# Patient Record
Sex: MALE | Race: ASIAN | Employment: FULL TIME | URBAN - METROPOLITAN AREA
[De-identification: names, ages, dates, MRNs, and addresses within clinical notes are randomized per-mention and may not be internally consistent; named-entity substitution may affect disease eponyms.]

---

## 2017-05-26 ENCOUNTER — OFFICE VISIT (OUTPATIENT)
Dept: INTERNAL MEDICINE CLINIC | Age: 62
End: 2017-05-26

## 2017-05-26 VITALS
BODY MASS INDEX: 25.77 KG/M2 | TEMPERATURE: 99 F | DIASTOLIC BLOOD PRESSURE: 70 MMHG | WEIGHT: 174 LBS | RESPIRATION RATE: 19 BRPM | HEART RATE: 89 BPM | SYSTOLIC BLOOD PRESSURE: 140 MMHG | HEIGHT: 69 IN | OXYGEN SATURATION: 91 %

## 2017-05-26 DIAGNOSIS — Z12.11 COLON CANCER SCREENING: ICD-10-CM

## 2017-05-26 DIAGNOSIS — I10 ESSENTIAL HYPERTENSION WITH GOAL BLOOD PRESSURE LESS THAN 130/85: ICD-10-CM

## 2017-05-26 DIAGNOSIS — E11.9 CONTROLLED TYPE 2 DIABETES MELLITUS WITHOUT COMPLICATION, WITHOUT LONG-TERM CURRENT USE OF INSULIN (HCC): Primary | ICD-10-CM

## 2017-05-26 LAB
GLUCOSE POC: 257 MG/DL
HBA1C MFR BLD HPLC: 9.4 %

## 2017-05-26 RX ORDER — PIOGLITAZONEHYDROCHLORIDE 45 MG/1
45 TABLET ORAL DAILY
Qty: 30 TAB | Refills: 12 | Status: SHIPPED | OUTPATIENT
Start: 2017-05-26 | End: 2018-02-02 | Stop reason: SDUPTHER

## 2017-05-26 RX ORDER — METFORMIN HYDROCHLORIDE 500 MG/1
TABLET ORAL
Qty: 120 TAB | Refills: 12 | Status: SHIPPED | OUTPATIENT
Start: 2017-05-26 | End: 2018-02-02 | Stop reason: SDUPTHER

## 2017-05-26 RX ORDER — GLIPIZIDE 10 MG/1
10 TABLET ORAL 2 TIMES DAILY
Qty: 60 TAB | Refills: 12 | Status: SHIPPED | OUTPATIENT
Start: 2017-05-26 | End: 2018-02-02 | Stop reason: SDUPTHER

## 2017-05-26 RX ORDER — ATORVASTATIN CALCIUM 40 MG/1
40 TABLET, FILM COATED ORAL
Qty: 30 TAB | Refills: 12 | Status: SHIPPED | OUTPATIENT
Start: 2017-05-26 | End: 2017-11-02 | Stop reason: SDUPTHER

## 2017-05-26 RX ORDER — LISINOPRIL 10 MG/1
TABLET ORAL
Qty: 30 TAB | Refills: 11 | Status: SHIPPED | OUTPATIENT
Start: 2017-05-26 | End: 2018-12-13 | Stop reason: SDUPTHER

## 2017-05-26 NOTE — MR AVS SNAPSHOT
Visit Information Date & Time Provider Department Dept. Phone Encounter #  
 5/26/2017 10:30 AM Carlos Raphael MD [address] [place] [address] [phone] [id number] Follow-up Instructions Return in about 3 months (around 8/26/2017), or if symptoms worsen or fail to improve. Upcoming Health Maintenance Date Due  
 EYE EXAM RETINAL OR DILATED Q1 9/14/1964 Pneumococcal 19-64 Medium Risk (1 of 1 - PPSV23) 9/14/1973 DTaP/Tdap/Td series (1 - Tdap) 9/14/1975 HEMOGLOBIN A1C Q6M 2/8/2017 FOBT Q 1 YEAR AGE 50-75 2/11/2017 INFLUENZA AGE 9 TO ADULT 8/1/2017 LIPID PANEL Q1 8/8/2017 FOOT EXAM Q1 5/26/2018 MICROALBUMIN Q1 5/26/2018 Allergies as of 5/26/2017  Review Complete On: 5/26/2017 By: Carlos Raphael MD  
 No Known Allergies Current Immunizations  Reviewed on 11/24/2014 Name Date Influenza Vaccine Intradermal PF 11/24/2014 Not reviewed this visit You Were Diagnosed With   
  
 Codes Comments Controlled type 2 diabetes mellitus without complication, without long-term current use of insulin ([facility]    -  Primary ICD-10-CM: E11.9 ICD-9-CM: 250.00 Essential hypertension with goal blood pressure less than 130/85     ICD-10-CM: I10 
ICD-9-CM: 401.9 Colon cancer screening     ICD-10-CM: Z12.11 ICD-9-CM: V76.51 Vitals BP Pulse Temp Resp Height(growth percentile) Weight(growth percentile) 140/70 89 99 °F (37.2 °C) (Oral) 19 5' 9\" (1.753 m) 174 lb (78.9 kg) SpO2 BMI Smoking Status 91% 25.7 kg/m2 Current Every Day Smoker BMI and BSA Data Body Mass Index Body Surface Area 25.7 kg/m 2 1.96 m 2 Preferred Pharmacy Pharmacy Name Phone [address], [address] [phone] Your Updated Medication List  
  
   
This list is accurate as of: 5/26/17 11:23 AM.  Always use your most recent med list.  
  
  
  
  
 atorvastatin 40 mg tablet Commonly known as:  LIPITOR Take 1 Tab by mouth nightly. Blood-Glucose Meter monitoring kit Use twice a day  
  
 glipiZIDE 10 mg tablet Commonly known as:  Lawerence Gal Take 1 Tab by mouth two (2) times a day. lisinopril 10 mg tablet Commonly known as:  PRINIVIL, ZESTRIL  
TAKE 1 TABLET BY MOUTH EVERY DAY  
  
 metFORMIN 500 mg tablet Commonly known as:  GLUCOPHAGE  
TAKE 2 TABLETS BY MOUTH TWICE DAILY pioglitazone 45 mg tablet Commonly known as:  ACTOS Take 1 Tab by mouth daily. sildenafil citrate 100 mg tablet Commonly known as:  VIAGRA Take 1 Tab by mouth as needed. Prescriptions Sent to Pharmacy Refills  
 metFORMIN (GLUCOPHAGE) 500 mg tablet 12 Sig: TAKE 2 TABLETS BY MOUTH TWICE DAILY Class: Normal  
 Pharmacy: [address] Ph #: [phone]  
 glipiZIDE (GLUCOTROL) 10 mg tablet 12 Sig: Take 1 Tab by mouth two (2) times a day. Class: Normal  
 Pharmacy: [address] Ph #: [phone] Route: Oral  
 lisinopril (PRINIVIL, ZESTRIL) 10 mg tablet 11 Sig: TAKE 1 TABLET BY MOUTH EVERY DAY Class: Normal  
 Pharmacy: [address] Ph #: [phone]  
 atorvastatin (LIPITOR) 40 mg tablet 12 Sig: Take 1 Tab by mouth nightly. Class: Normal  
 Pharmacy: [address] Ph #: [phone] Route: Oral  
 pioglitazone (ACTOS) 45 mg tablet 12 Sig: Take 1 Tab by mouth daily.   
 Class: Normal  
 Pharmacy: [address] [facility] #: [phone] Route: Oral  
  
We Performed the Following AMB POC GLUCOSE BLOOD, BY GLUCOSE MONITORING DEVICE [27942 CPT(R)] AMB POC HEMOGLOBIN A1C [58771 CPT(R)] AMB POC URINE, MICROALBUMIN, SEMIQUANTITATIVE [51164 CPT(R)] LIPID PANEL [14982 CPT(R)] OCCULT BLOOD, IMMUNOASSAY (FIT) [id number] CPT(R)] Follow-up Instructions Return in about 3 months (around 2017), or if symptoms worsen or fail to improve. Patient Instructions CipherGraph Networkshart Activation Thank you for requesting access to BeeTV. Please follow the instructions below to securely access and download your online medical record. BeeTV allows you to send messages to your doctor, view your test results, renew your prescriptions, schedule appointments, and more. How Do I Sign Up? 1. In your internet browser, go to [email or web address] 
2. Click on the First Time User? Click Here link in the Sign In box. You will be redirect to the New Member Sign Up page. 3. Enter your BeeTV Access Code exactly as it appears below. You will not need to use this code after youve completed the sign-up process. If you do not sign up before the expiration date, you must request a new code. BeeTV Access Code: Activation code not generated Current BeeTV Status: Patient Declined (This is the date your BeeTV access code will ) 4. Enter the last four digits of your Social Security Number (xxxx) and Date of Birth (mm/dd/yyyy) as indicated and click Submit. You will be taken to the next sign-up page. 5. Create a BeeTV ID. This will be your BeeTV login ID and cannot be changed, so think of one that is secure and easy to remember. 6. Create a BeeTV password. You can change your password at any time. 7. Enter your Password Reset Question and Answer. This can be used at a later time if you forget your password. 8. Enter your e-mail address. You will receive e-mail notification when new information is available in [address] 9. Click Sign Up. You can now view and download portions of your medical record. 10. Click the Download Summary menu link to download a portable copy of your medical information. Additional Information If you have questions, please visit the Frequently Asked Questions section of the [email or web address] website at [email or web address] Remember, [email or web address] is NOT to be used for urgent needs. For medical emergencies, dial 911. Please provide this summary of care documentation to your next provider. Your primary care clinician is listed as Cristóbal Vanegas. If you have any questions after today's visit, please call [phone].

## 2017-05-26 NOTE — PATIENT INSTRUCTIONS
APSharAdFinance Activation    Thank you for requesting access to AddressReport. Please follow the instructions below to securely access and download your online medical record. AddressReport allows you to send messages to your doctor, view your test results, renew your prescriptions, schedule appointments, and more. How Do I Sign Up? 1. In your internet browser, go to [email or web address].[email or web address] [email or web address]  2. Click on the First Time User? Click Here link in the Sign In box. You will be redirect to the New Member Sign Up page. 3. Enter your AddressReport Access Code exactly as it appears below. You will not need to use this code after youve completed the sign-up process. If you do not sign up before the expiration date, you must request a new code. AddressReport Access Code: Activation code not generated  Current AddressReport Status: Patient Declined (This is the date your AddressReport access code will )    4. Enter the last four digits of your Social Security Number (xxxx) and Date of Birth (mm/dd/yyyy) as indicated and click Submit. You will be taken to the next sign-up page. 5. Create a AddressReport ID. This will be your AddressReport login ID and cannot be changed, so think of one that is secure and easy to remember. 6. Create a AddressReport password. You can change your password at any time. 7. Enter your Password Reset Question and Answer. This can be used at a later time if you forget your password. 8. Enter your e-mail address. You will receive e-mail notification when new information is available in [address] 9. Click Sign Up. You can now view and download portions of your medical record. 10. Click the Download Summary menu link to download a portable copy of your medical information. Additional Information    If you have questions, please visit the Frequently Asked Questions section of the AddressReport website at [email or web address] Remember, AddressReport is NOT to be used for urgent needs. For medical emergencies, dial 911.

## 2017-05-26 NOTE — PROGRESS NOTES
Reba Palacios is a 57 y.o. male and presents with Diabetes and Hypertension  . Subjective:  Hypertension Review:  The patient has hypertension . Diet and Lifestyle: generally follows a low sodium diet, exercises sporadically  Home BP Monitoring: is not measured at home. Pertinent ROS: taking medications as instructed, no medication side effects noted, no TIA's, no chest pain on exertion, no dyspnea on exertion, no swelling of ankles. Dyslipidemia Review:  Patient presents for evaluation of lipids. Compliance with treatment thus far has been excellent. A repeat fasting lipid profile was done. The patient does not use medications that may worsen dyslipidemias . The patient exercises sporadically. The patient is not known to have coexisting coronary artery disease    Diabetes Mellitus Review:  He has diabetes mellitus. Diabetic ROS - medication noncompliance: , diabetic diet compliance: compliant all of the time, home glucose monitoring: is performed. Known diabetic complications: none  Cardiovascular risk factors: family history, dyslipidemia, diabetes mellitus, obesity, hypertension  Current diabetic medications include oral agents   Eye exam current (within one year): no  Weight trend: stable  Prior visit with dietician: no  Current diet: \"healthy\" diet  in general  Current exercise: walking  Current monitoring regimen: home blood tests - daily  Home blood sugar records: trend: stable  Any episodes of hypoglycemia? no  Is He on ACE inhibitor or angiotensin II receptor blocker? Yes   Lab review: orders written for new lab studies as appropriate; see orders. Erectile dysfunction Review[de-identified]  Patient complains of difficulty in maintaining an adequate erection. He states that his present medication is helping thus far.     Health maintenance suggests the needs for a test for occult blood    Health maintenance suggest urine protein check          Review of Systems  Constitutional: negative for fevers, chills, anorexia and weight loss  Eyes:   negative for visual disturbance and irritation  ENT:   negative for tinnitus,sore throat,nasal congestion,ear pains. hoarseness  Respiratory:  negative for cough, hemoptysis, dyspnea,wheezing  CV:   negative for chest pain, palpitations, lower extremity edema  GI:   negative for nausea, vomiting, diarrhea, abdominal pain,melena  Endo:               negative for polyuria,polydipsia,polyphagia,heat intolerance  Genitourinary: negative for frequency, dysuria and hematuria  Integument:  negative for rash and pruritus  Hematologic:  negative for easy bruising and gum/nose bleeding  Musculoskel: negative for myalgias, arthralgias, back pain, muscle weakness, joint pain  Neurological:  negative for headaches, dizziness, vertigo, memory problems and gait   Behavl/Psych: negative for feelings of anxiety, depression, mood changes    Past Medical History:   Diagnosis Date    [facility] 4/13/2011    Edema 4/13/2011    Hypertension 4/13/2011     History reviewed. No pertinent surgical history. Social History     Social History    Marital status:      Spouse name: N/A    Number of children: N/A    Years of education: N/A     Social History Main Topics    Smoking status: Current Every Day Smoker    Smokeless tobacco: Never Used    Alcohol use Yes    Drug use: No    Sexual activity: Not Asked     Other Topics Concern    None     Social History Narrative     Family History   Problem Relation Age of Onset    Diabetes Mother      Current Outpatient Prescriptions   Medication Sig Dispense Refill    metFORMIN (GLUCOPHAGE) 500 mg tablet TAKE 2 TABLETS BY MOUTH TWICE DAILY 120 Tab 12    glipiZIDE (GLUCOTROL) 10 mg tablet Take 1 Tab by mouth two (2) times a day. 60 Tab 12    lisinopril (PRINIVIL, ZESTRIL) 10 mg tablet TAKE 1 TABLET BY MOUTH EVERY DAY 30 Tab 11    atorvastatin (LIPITOR) 40 mg tablet Take 1 Tab by mouth nightly.  30 Tab 12    pioglitazone (ACTOS) 45 mg tablet Take 1 Tab by mouth daily. 30 Tab 12    Blood-Glucose Meter monitoring kit Use twice a day 1 Kit 0    sildenafil citrate (VIAGRA) 100 mg tablet Take 1 Tab by mouth as needed. 4 Tab 12     No Known Allergies    Objective:  Visit Vitals    /70    Pulse 89    Temp 99 °F (37.2 °C) (Oral)    Resp 19    Ht 5' 9\" (1.753 m)    Wt 174 lb (78.9 kg)    SpO2 91%    BMI 25.7 kg/m2     Physical Exam:   General appearance - alert, well appearing, and in no distress  Mental status - alert, oriented to person, place, and time  EYE-HEIDY, EOMI, corneas normal, no foreign bodies  ENT-ENT exam normal, no neck nodes or sinus tenderness  Nose - normal and patent, no erythema, discharge or polyps  Mouth - mucous membranes moist, pharynx normal without lesions  Neck - supple, no significant adenopathy   Chest - clear to auscultation, no wheezes, rales or rhonchi, symmetric air entry   Heart - normal rate, regular rhythm, normal S1, S2, no murmurs, rubs, clicks or gallops   Abdomen - soft, nontender, nondistended, no masses or organomegaly  Lymph- no adenopathy palpable  Ext-peripheral pulses normal, no pedal edema, no clubbing or cyanosis  Skin-Warm and dry.  no hyperpigmentation, vitiligo, or suspicious lesions  Neuro -alert, oriented, normal speech, no focal findings or movement disorder noted  Neck-normal C-spine, no tenderness, full ROM without pain  Feet-no nail deformities or callus formation with good pulses noted      Results for orders placed or performed in visit on 08/08/16   PROSTATE SPECIFIC AG (PSA)   Result Value Ref Range    Prostate Specific Ag 1.4 0.0 - 4.0 ng/mL   AMB POC GLUCOSE BLOOD, BY GLUCOSE MONITORING DEVICE   Result Value Ref Range    Glucose  mg/dL   AMB POC LIPID PROFILE   Result Value Ref Range    Cholesterol (POC) 173     Triglycerides (POC) 69     HDL Cholesterol (POC) 32     LDL Cholesterol (POC) 127     Non-HDL Goal (POC) 141     TChol/HDL Ratio (POC) 5.4    AMB POC HEMOGLOBIN A1C   Result Value Ref Range    Hemoglobin A1c (POC) 7.5 %       Assessment/Plan:    ICD-10-CM ICD-9-CM    1. Controlled type 2 diabetes mellitus without complication, without long-term current use of insulin (HCC) E11.9 250.00 AMB POC GLUCOSE BLOOD, BY GLUCOSE MONITORING DEVICE      AMB POC HEMOGLOBIN A1C      LIPID PANEL      metFORMIN (GLUCOPHAGE) 500 mg tablet      glipiZIDE (GLUCOTROL) 10 mg tablet      lisinopril (PRINIVIL, ZESTRIL) 10 mg tablet      atorvastatin (LIPITOR) 40 mg tablet      pioglitazone (ACTOS) 45 mg tablet   2. Essential hypertension with goal blood pressure less than 130/85 I10 401.9 AMB POC GLUCOSE BLOOD, BY GLUCOSE MONITORING DEVICE      AMB POC HEMOGLOBIN A1C      LIPID PANEL      glipiZIDE (GLUCOTROL) 10 mg tablet      lisinopril (PRINIVIL, ZESTRIL) 10 mg tablet      atorvastatin (LIPITOR) 40 mg tablet      pioglitazone (ACTOS) 45 mg tablet     Orders Placed This Encounter    LIPID PANEL    AMB POC GLUCOSE BLOOD, BY GLUCOSE MONITORING DEVICE    AMB POC HEMOGLOBIN A1C    metFORMIN (GLUCOPHAGE) 500 mg tablet     Sig: TAKE 2 TABLETS BY MOUTH TWICE DAILY     Dispense:  120 Tab     Refill:  12    glipiZIDE (GLUCOTROL) 10 mg tablet     Sig: Take 1 Tab by mouth two (2) times a day. Dispense:  60 Tab     Refill:  12    lisinopril (PRINIVIL, ZESTRIL) 10 mg tablet     Sig: TAKE 1 TABLET BY MOUTH EVERY DAY     Dispense:  30 Tab     Refill:  11    atorvastatin (LIPITOR) 40 mg tablet     Sig: Take 1 Tab by mouth nightly. Dispense:  30 Tab     Refill:  12    pioglitazone (ACTOS) 45 mg tablet     Sig: Take 1 Tab by mouth daily. Dispense:  30 Tab     Refill:  12     lose weight, increase physical activity, follow low fat diet, follow low salt diet,Take 81mg aspirin daily  Patient Instructions   Civicon Activation    Thank you for requesting access to Civicon. Please follow the instructions below to securely access and download your online medical record.  Civicon allows you to send messages to your doctor, view your test results, renew your prescriptions, schedule appointments, and more. How Do I Sign Up? 1. In your internet browser, go to [email or web address].[email or web address]  2. Click on the First Time User? Click Here link in the Sign In box. You will be redirect to the New Member Sign Up page. 3. Enter your "[employer] Access Code exactly as it appears below. You will not need to use this code after youve completed the sign-up process. If you do not sign up before the expiration date, you must request a new code. MyChart Access Code: Activation code not generated  Current "[employer] Status: Patient Declined (This is the date your Ecloud (Nanj[employer] Information and Technologyt access code will )    4. Enter the last four digits of your Social Security Number (xxxx) and Date of Birth (mm/dd/yyyy) as indicated and click Submit. You will be taken to the next sign-up page. 5. Create a "[employer] ID. This will be your "[employer] login ID and cannot be changed, so think of one that is secure and easy to remember. 6. Create a "[employer] password. You can change your password at any time. 7. Enter your Password Reset Question and Answer. This can be used at a later time if you forget your password. 8. Enter your e-mail address. You will receive e-mail notification when new information is available in [address] 9. Click Sign Up. You can now view and download portions of your medical record. 10. Click the Download Summary menu link to download a portable copy of your medical information. Additional Information    If you have questions, please visit the Frequently Asked Questions section of the "[employer] website at [email or web address] Remember, "[employer] is NOT to be used for urgent needs. For medical emergencies, dial 911. Follow-up Disposition:  Return in about 3 months (around 2017), or if symptoms worsen or fail to improve.       I have reviewed with the patient details of the assessment and plan and all questions were answered. Relevent patient education was performed. The most recent lab findings were reviewed with the patient. An After Visit Summary was printed and given to the patient.

## 2017-05-27 LAB
CHOLEST SERPL-MCNC: 183 MG/DL (ref 100–199)
HDLC SERPL-MCNC: 33 MG/DL
INTERPRETATION, 910389: NORMAL
LDLC SERPL CALC-MCNC: 132 MG/DL (ref 0–99)
TRIGL SERPL-MCNC: 92 MG/DL (ref 0–149)
VLDLC SERPL CALC-MCNC: 18 MG/DL (ref 5–40)

## 2017-10-27 ENCOUNTER — OFFICE VISIT (OUTPATIENT)
Dept: INTERNAL MEDICINE CLINIC | Age: 62
End: 2017-10-27

## 2017-10-27 VITALS
BODY MASS INDEX: 26.81 KG/M2 | TEMPERATURE: 97.6 F | SYSTOLIC BLOOD PRESSURE: 136 MMHG | OXYGEN SATURATION: 94 % | HEART RATE: 51 BPM | WEIGHT: 181 LBS | RESPIRATION RATE: 16 BRPM | DIASTOLIC BLOOD PRESSURE: 75 MMHG | HEIGHT: 69 IN

## 2017-10-27 DIAGNOSIS — J44.9 CHRONIC OBSTRUCTIVE PULMONARY DISEASE, UNSPECIFIED COPD TYPE (HCC): ICD-10-CM

## 2017-10-27 DIAGNOSIS — N40.0 BENIGN PROSTATIC HYPERPLASIA WITHOUT LOWER URINARY TRACT SYMPTOMS: ICD-10-CM

## 2017-10-27 DIAGNOSIS — R80.9 PROTEINURIA, UNSPECIFIED TYPE: ICD-10-CM

## 2017-10-27 DIAGNOSIS — E78.5 DYSLIPIDEMIA: ICD-10-CM

## 2017-10-27 DIAGNOSIS — I10 ESSENTIAL HYPERTENSION: ICD-10-CM

## 2017-10-27 DIAGNOSIS — R19.5 OCCULT BLOOD IN STOOLS: ICD-10-CM

## 2017-10-27 DIAGNOSIS — Z00.00 PHYSICAL EXAM: ICD-10-CM

## 2017-10-27 NOTE — MR AVS SNAPSHOT
Visit Information Date & Time Provider Department Dept. Phone Encounter #  
 10/27/2017 10:00 AM Tip Aguilar MD [facility] [phone] [id number] Follow-up Instructions Return in about 4 weeks (around 11/24/2017). Upcoming Health Maintenance Date Due FOBT Q 1 YEAR AGE 50-75 2/11/2017 HEMOGLOBIN A1C Q6M 11/26/2017 EYE EXAM RETINAL OR DILATED Q1 2/27/2018* FOOT EXAM Q1 5/26/2018 MICROALBUMIN Q1 5/26/2018 LIPID PANEL Q1 5/26/2018 DTaP/Tdap/Td series (2 - Td) 10/27/2027 *Topic was postponed. The date shown is not the original due date. Allergies as of 10/27/2017  Review Complete On: 10/27/2017 By: Crystal Benson No Known Allergies Current Immunizations  Reviewed on 11/24/2014 Name Date Influenza Vaccine Intradermal PF 11/24/2014 Not reviewed this visit You Were Diagnosed With   
  
 Codes Comments Uncontrolled type 2 diabetes mellitus with complication, without long-term current use of insulin ([facility]    -  Primary ICD-10-CM: E11.8, E11.65 ICD-9-CM: 250.82 Essential hypertension     ICD-10-CM: I10 
ICD-9-CM: 401.9 Dyslipidemia     ICD-10-CM: E78.5 ICD-9-CM: 272.4 Chronic obstructive pulmonary disease, unspecified COPD type ([facility]     ICD-10-CM: J44.9 ICD-9-CM: 061 Proteinuria, unspecified type     ICD-10-CM: R80.9 ICD-9-CM: 791.0 Benign prostatic hyperplasia without lower urinary tract symptoms     ICD-10-CM: N40.0 ICD-9-CM: 600.00 Occult blood in stools     ICD-10-CM: R19.5 ICD-9-CM: 792.1 Vitals BP Pulse Temp Resp Height(growth percentile) Weight(growth percentile) 136/75 (BP 1 Location: Right arm, BP Patient Position: Sitting) (!) 51 97.6 °F (36.4 °C) (Oral) 16 5' 9\" (1.753 m) 181 lb (82.1 kg) SpO2 BMI Smoking Status 94% 26.73 kg/m2 Current Every Day Smoker Vitals History BMI and BSA Data Body Mass Index Body Surface Area  
 26.73 kg/m 2 2 m 2 Preferred Pharmacy Pharmacy Name Phone [address], [address] [phone] Your Updated Medication List  
  
   
This list is accurate as of: 10/27/17  2:41 PM.  Always use your most recent med list.  
  
  
  
  
 atorvastatin 40 mg tablet Commonly known as:  LIPITOR Take 1 Tab by mouth nightly. Blood-Glucose Meter monitoring kit Use twice a day  
  
 glipiZIDE 10 mg tablet Commonly known as:  Occitan Fernanda Take 1 Tab by mouth two (2) times a day. lisinopril 10 mg tablet Commonly known as:  PRINIVIL, ZESTRIL  
TAKE 1 TABLET BY MOUTH EVERY DAY  
  
 metFORMIN 500 mg tablet Commonly known as:  GLUCOPHAGE  
TAKE 2 TABLETS BY MOUTH TWICE DAILY pioglitazone 45 mg tablet Commonly known as:  ACTOS Take 1 Tab by mouth daily. sildenafil citrate 100 mg tablet Commonly known as:  VIAGRA Take 1 Tab by mouth as needed. We Performed the Following AMB POC EKG ROUTINE W/ 12 LEADS, INTER & REP [99960 CPT(R)] APOLIPOPROTEIN B [id number] CPT(R)] CBC WITH AUTOMATED DIFF [45195 CPT(R)] CREATININE, UR, RANDOM [id number] CPT(R)] HEMOGLOBIN A1C WITH EAG [76463 CPT(R)] LIPID PANEL [73311 CPT(R)] METABOLIC PANEL, COMPREHENSIVE [66868 CPT(R)] OCCULT BLOOD, IMMUNOASSAY (FIT) [id number] CPT(R)] AL COLLECTION VENOUS BLOOD,VENIPUNCTURE [id number] CPT(R)] Melvin Comes [[id number] Custom] PSA, DIAGNOSTIC (PROSTATE SPECIFIC AG) [id number] CPT(R)] URINALYSIS W/ RFLX MICROSCOPIC [84328 CPT(R)] Follow-up Instructions Return in about 4 weeks (around 11/24/2017). Introducing [facility]! Dear Jerry Veliz: Thank you for requesting a [employer] account.   Our records indicate that you have previously registered for a [employer]rote[employer] account but its currently inactive. Please call our Spine Wave support line at [phone]. Additional Information If you have questions, please visit the Frequently Asked Questions section of the Spine Wave website at [email or web address] Remember, Spine Wave is NOT to be used for urgent needs. For medical emergencies, dial 911. Now available from your iPhone and Android! Please provide this summary of care documentation to your next provider. If you have any questions after today's visit, please call [phone].

## 2017-10-27 NOTE — PROGRESS NOTES
Chief Complaint   Patient presents with    New Patient     Hx of diabetes      1. Have you been to the ER, urgent care clinic since your last visit? Hospitalized since your last visit? No    2. Have you seen or consulted any other health care providers outside of the [address] [place] [address] since your last visit? Include any pap smears or colon screening.  No

## 2017-10-27 NOTE — PROGRESS NOTES
[address] [facility]  [place] [address]  [address]  [address] [address]  Phone:  [phone]  Fax: [phone]    Chief Complaint   Patient presents with    New Patient     Hx of diabetes        SUBJECTIVE:    Chinyere Addison is a 60 y.o. male He comes in as a new patient. He has had diabetes for at least 8+ years. He has been taking two agents. He states that his average sugar is in the 120 range. His last hemoglobin A1C; however, was a bit elevated. He has been taking antihypertensive medication for the eight year time, which is Lisinopril only. He is also maintained on a statin for his increased cardiovascular risks. He has a long history of cigarette smoking and he continues to smoke a half pack per day. He currently has no pulmonary symptoms. When he was first diagnosed, he was in the 220 range and he is now down to the 180 range    He works on a regular basis and is in no way impaired. Current Outpatient Prescriptions   Medication Sig Dispense Refill    metFORMIN (GLUCOPHAGE) 500 mg tablet TAKE 2 TABLETS BY MOUTH TWICE DAILY 120 Tab 12    glipiZIDE (GLUCOTROL) 10 mg tablet Take 1 Tab by mouth two (2) times a day. 60 Tab 12    lisinopril (PRINIVIL, ZESTRIL) 10 mg tablet TAKE 1 TABLET BY MOUTH EVERY DAY 30 Tab 11    atorvastatin (LIPITOR) 40 mg tablet Take 1 Tab by mouth nightly. 30 Tab 12    pioglitazone (ACTOS) 45 mg tablet Take 1 Tab by mouth daily. 30 Tab 12    sildenafil citrate (VIAGRA) 100 mg tablet Take 1 Tab by mouth as needed. 4 Tab 12    Blood-Glucose Meter monitoring kit Use twice a day 1 Kit 0     Past Medical History:   Diagnosis Date    Callous ulcer ([facility] 4/13/2011    Edema 4/13/2011    Hypertension 4/13/2011     History reviewed. No pertinent surgical history.   No Known Allergies    REVIEW OF SYSTEMS:  General: negative for - chills or fever  ENT: negative for - headaches, nasal congestion or tinnitus  Respiratory: negative for - cough, hemoptysis, shortness of breath or wheezing  Cardiovascular : negative for - chest pain, edema, palpitations or shortness of breath  Gastrointestinal: negative for - abdominal pain, blood in stools, heartburn or nausea/vomiting  Genito-Urinary: no dysuria, trouble voiding, or hematuria  Musculoskeletal: negative for - gait disturbance, joint pain, joint stiffness or joint swelling  Neurological: no TIA or stroke symptoms  Hematologic: no bruises, no bleeding, no swollen glands  Integument: no lumps, mole changes, nail changes or rash  Endocrine:no malaise/lethargy or unexpected weight changes      Social History     Social History    Marital status:      Spouse name: N/A    Number of children: N/A    Years of education: N/A     Social History Main Topics    Smoking status: Current Every Day Smoker     Packs/day: 0.25     Years: 45.00    Smokeless tobacco: Never Used    Alcohol use Yes    Drug use: No    Sexual activity: Yes     Partners: Female     Other Topics Concern    None     Social History Narrative     Family History   Problem Relation Age of Onset    Diabetes Mother        OBJECTIVE:     Visit Vitals    /75 (BP 1 Location: Right arm, BP Patient Position: Sitting)    Pulse (!) 51    Temp 97.6 °F (36.4 °C) (Oral)    Resp 16    Ht 5' 9\" (1.753 m)    Wt 181 lb (82.1 kg)    SpO2 94%    BMI 26.73 kg/m2     CONSTITUTIONAL: well , well nourished, appears age appropriate  EYES: perrla, eom intact  ENMT:moist mucous membranes, pharynx clear  NECK: supple. Thyroid normal  RESPIRATORY: Chest:, decreased breath sounds bilaterally, rare basilar rales  CARDIOVASCULAR: Heart: regular rate and rhythm  GASTROINTESTINAL: Abdomen: soft, bowel sounds active  HEMATOLOGIC: no pathological lymph nodes palpated  MUSCULOSKELETAL: Extremities: no edema, pulse 1+   INTEGUMENT: No unusual rashes or suspicious skin lesions noted.  Nails appear normal.  NEUROLOGIC: non-focal exam   MENTAL STATUS: alert and oriented, appropriate affect  Genitalia: Normal male and no hernias  Rectal: Prostate 2+ firm and nontender, no rectal masses, brown stool heme test negative    ASSESSMENT:   1. Uncontrolled type 2 diabetes mellitus with complication, without long-term current use of insulin ([address]    2. Essential hypertension    3. Dyslipidemia    4. Chronic obstructive pulmonary disease, unspecified COPD type ([address]    5. Proteinuria, unspecified type    6. Benign prostatic hyperplasia without lower urinary tract symptoms    7. Occult blood in stools    8. Physical exam        PLAN:  1. The patient's diabetes is probably not well controlled. I will await the results of his hemoglobin A1C.    2. There is modest proteinuria present. This would suggest the possibility of diabetic nephropathy, but I need to quantitate the degree of proteinuria. If this is the case then patient has had diabetes for much longer than eight years. 3. Blood pressure is excellent and no adjustments are made at this point. His blood pressure is 136/70.    4. He is at significantly increased cardiovascular risk. He remains on a statin, which is quite appropriate. 5. He has significant COPD and I encouraged him to discontinue cigarette smoking. .  Orders Placed This Encounter    OCCULT BLOOD, IMMUNOASSAY (FIT)    APOLIPOPROTEIN B    CBC WITH AUTOMATED DIFF    LIPID PANEL    URINALYSIS W/ RFLX MICROSCOPIC    PROSTATE SPECIFIC AG    METABOLIC PANEL, COMPREHENSIVE    HEMOGLOBIN A1C WITH EAG    PROTEIN,TOTAL,URINE    CREATININE, UR, RANDOM    MICROSCOPIC EXAMINATION    AMB POC EKG ROUTINE W/ 12 LEADS, INTER & REP         ATTENTION:   This medical record was transcribed using an electronic medical records system. Although proofread, it may and can contain electronic and spelling errors. Other human spelling and other errors may be present. Corrections may be executed at a later time.   Please feel free to contact us for any clarifications as needed. Follow-up Disposition:  Return in about 4 weeks (around 11/24/2017).       Yuridia Finley MD

## 2017-10-28 LAB
ALBUMIN SERPL-MCNC: 4 G/DL (ref 3.6–4.8)
ALBUMIN/GLOB SERPL: 1.3 {RATIO} (ref 1.2–2.2)
ALP SERPL-CCNC: 110 IU/L (ref 39–117)
ALT SERPL-CCNC: 17 IU/L (ref 0–44)
APO B SERPL-MCNC: 99 MG/DL (ref 52–135)
APPEARANCE UR: CLEAR
AST SERPL-CCNC: 15 IU/L (ref 0–40)
BACTERIA #/AREA URNS HPF: NORMAL /[HPF]
BASOPHILS # BLD AUTO: 0.1 X10E3/UL (ref 0–0.2)
BASOPHILS NFR BLD AUTO: 1 %
BILIRUB SERPL-MCNC: 0.6 MG/DL (ref 0–1.2)
BILIRUB UR QL STRIP: NEGATIVE
BUN SERPL-MCNC: 12 MG/DL (ref 8–27)
BUN/CREAT SERPL: 11 (ref 10–24)
CALCIUM SERPL-MCNC: 9.9 MG/DL (ref 8.6–10.2)
CASTS URNS QL MICRO: NORMAL /LPF
CHLORIDE SERPL-SCNC: 100 MMOL/L (ref 96–106)
CHOLEST SERPL-MCNC: 172 MG/DL (ref 100–199)
CO2 SERPL-SCNC: 27 MMOL/L (ref 18–29)
COLOR UR: YELLOW
CREAT SERPL-MCNC: 1.05 MG/DL (ref 0.76–1.27)
CREAT UR-MCNC: 92.6 MG/DL
EOSINOPHIL # BLD AUTO: 0.2 X10E3/UL (ref 0–0.4)
EOSINOPHIL NFR BLD AUTO: 2 %
EPI CELLS #/AREA URNS HPF: NORMAL /HPF
ERYTHROCYTE [DISTWIDTH] IN BLOOD BY AUTOMATED COUNT: 15 % (ref 12.3–15.4)
EST. AVERAGE GLUCOSE BLD GHB EST-MCNC: 189 MG/DL
GFR SERPLBLD CREATININE-BSD FMLA CKD-EPI: 75 ML/MIN/1.73
GFR SERPLBLD CREATININE-BSD FMLA CKD-EPI: 87 ML/MIN/1.73
GLOBULIN SER CALC-MCNC: 3.2 G/DL (ref 1.5–4.5)
GLUCOSE SERPL-MCNC: 163 MG/DL (ref 65–99)
GLUCOSE UR QL: NEGATIVE
HBA1C MFR BLD: 8.2 % (ref 4.8–5.6)
HCT VFR BLD AUTO: 48.2 % (ref 37.5–51)
HDLC SERPL-MCNC: 38 MG/DL
HGB BLD-MCNC: 16.9 G/DL (ref 12.6–17.7)
HGB UR QL STRIP: NEGATIVE
IMM GRANULOCYTES # BLD: 0 X10E3/UL (ref 0–0.1)
IMM GRANULOCYTES NFR BLD: 0 %
KETONES UR QL STRIP: NEGATIVE
LDLC SERPL CALC-MCNC: 118 MG/DL (ref 0–99)
LEUKOCYTE ESTERASE UR QL STRIP: ABNORMAL
LYMPHOCYTES # BLD AUTO: 2.6 X10E3/UL (ref 0.7–3.1)
LYMPHOCYTES NFR BLD AUTO: 35 %
MCH RBC QN AUTO: 31 PG (ref 26.6–33)
MCHC RBC AUTO-ENTMCNC: 35.1 G/DL (ref 31.5–35.7)
MCV RBC AUTO: 88 FL (ref 79–97)
MICRO URNS: ABNORMAL
MONOCYTES # BLD AUTO: 0.4 X10E3/UL (ref 0.1–0.9)
MONOCYTES NFR BLD AUTO: 6 %
MUCOUS THREADS URNS QL MICRO: PRESENT
NEUTROPHILS # BLD AUTO: 4.1 X10E3/UL (ref 1.4–7)
NEUTROPHILS NFR BLD AUTO: 56 %
NITRITE UR QL STRIP: NEGATIVE
PH UR STRIP: 6 [PH] (ref 5–7.5)
PLATELET # BLD AUTO: 286 X10E3/UL (ref 150–379)
POTASSIUM SERPL-SCNC: 5.2 MMOL/L (ref 3.5–5.2)
PROT SERPL-MCNC: 7.2 G/DL (ref 6–8.5)
PROT UR QL STRIP: ABNORMAL
PROT UR-MCNC: 37.7 MG/DL
PSA SERPL-MCNC: 1.4 NG/ML (ref 0–4)
RBC # BLD AUTO: 5.46 X10E6/UL (ref 4.14–5.8)
RBC #/AREA URNS HPF: NORMAL /HPF
SODIUM SERPL-SCNC: 141 MMOL/L (ref 134–144)
SP GR UR: 1.02 (ref 1–1.03)
TRIGL SERPL-MCNC: 82 MG/DL (ref 0–149)
UROBILINOGEN UR STRIP-MCNC: 0.2 MG/DL (ref 0.2–1)
VLDLC SERPL CALC-MCNC: 16 MG/DL (ref 5–40)
WBC # BLD AUTO: 7.3 X10E3/UL (ref 3.4–10.8)
WBC #/AREA URNS HPF: NORMAL /HPF

## 2017-11-01 NOTE — PROGRESS NOTES
Tell patient to increase his 80 mg specifically 2 tablets daily to see if can tolerate this  Additionally telling his diabetes well controlled but it is not bad-----he needs to remain on all his medication and I will await his next value before making adjustments

## 2017-11-02 DIAGNOSIS — I10 ESSENTIAL HYPERTENSION WITH GOAL BLOOD PRESSURE LESS THAN 130/85: ICD-10-CM

## 2017-11-02 DIAGNOSIS — E11.9 CONTROLLED TYPE 2 DIABETES MELLITUS WITHOUT COMPLICATION, WITHOUT LONG-TERM CURRENT USE OF INSULIN (HCC): ICD-10-CM

## 2017-11-02 NOTE — TELEPHONE ENCOUNTER
PATIENT NOTIFIED PER Dr. Zandra Toscano and ask to increase his atorvastatin 40mg to 2 tabs to equal 80mg. patient told we will call in new dose.

## 2017-11-03 RX ORDER — ATORVASTATIN CALCIUM 80 MG/1
80 TABLET, FILM COATED ORAL
Qty: 30 TAB | Refills: 11 | Status: SHIPPED | OUTPATIENT
Start: 2017-11-03 | End: 2018-12-20 | Stop reason: SDUPTHER

## 2018-02-02 ENCOUNTER — OFFICE VISIT (OUTPATIENT)
Dept: INTERNAL MEDICINE CLINIC | Age: 63
End: 2018-02-02

## 2018-02-02 VITALS
SYSTOLIC BLOOD PRESSURE: 112 MMHG | TEMPERATURE: 97.8 F | BODY MASS INDEX: 26.93 KG/M2 | HEART RATE: 63 BPM | DIASTOLIC BLOOD PRESSURE: 66 MMHG | WEIGHT: 181.8 LBS | OXYGEN SATURATION: 93 % | RESPIRATION RATE: 16 BRPM | HEIGHT: 69 IN

## 2018-02-02 DIAGNOSIS — J44.9 CHRONIC OBSTRUCTIVE PULMONARY DISEASE, UNSPECIFIED COPD TYPE (HCC): ICD-10-CM

## 2018-02-02 DIAGNOSIS — E78.5 DYSLIPIDEMIA: ICD-10-CM

## 2018-02-02 DIAGNOSIS — E11.9 CONTROLLED TYPE 2 DIABETES MELLITUS WITHOUT COMPLICATION, WITHOUT LONG-TERM CURRENT USE OF INSULIN (HCC): ICD-10-CM

## 2018-02-02 DIAGNOSIS — B07.0 PLANTAR WART: Primary | ICD-10-CM

## 2018-02-02 DIAGNOSIS — I10 ESSENTIAL HYPERTENSION: ICD-10-CM

## 2018-02-02 RX ORDER — INSULIN PUMP SYRINGE, 3 ML
EACH MISCELLANEOUS
Qty: 1 KIT | Refills: 0 | Status: SHIPPED | OUTPATIENT
Start: 2018-02-02

## 2018-02-02 RX ORDER — GLIPIZIDE 10 MG/1
10 TABLET ORAL 2 TIMES DAILY
Qty: 60 TAB | Refills: 12 | Status: SHIPPED | OUTPATIENT
Start: 2018-02-02 | End: 2019-01-08 | Stop reason: SDUPTHER

## 2018-02-02 RX ORDER — PIOGLITAZONEHYDROCHLORIDE 45 MG/1
45 TABLET ORAL DAILY
Qty: 30 TAB | Refills: 12 | Status: SHIPPED | OUTPATIENT
Start: 2018-02-02 | End: 2019-03-12 | Stop reason: SDUPTHER

## 2018-02-02 RX ORDER — METFORMIN HYDROCHLORIDE 500 MG/1
TABLET ORAL
Qty: 120 TAB | Refills: 12 | Status: SHIPPED | OUTPATIENT
Start: 2018-02-02 | End: 2019-01-14 | Stop reason: CLARIF

## 2018-02-02 RX ORDER — LANCETS
EACH MISCELLANEOUS
Qty: 100 EACH | Refills: 11 | Status: SHIPPED | OUTPATIENT
Start: 2018-02-02

## 2018-02-02 NOTE — PROGRESS NOTES
[address] [facility]  [place] [address]  [address] [place] [address]  Phone:  [phone]  Fax: [phone]       Chief Complaint   Patient presents with    Hip Pain    Cold   . SUBJECTIVE:    Sherryle Bolder is a 60 y.o. male comes in for return visit accompanied by his lady friend. He has a lesion on his right foot which he has had for years. They think it is a callus. He has an upper respiratory infection present for the last two weeks. He has a residual cough currently. The cough is nonproductive. He does indeed smoke cigarettes. He needs refills on his antidiabetic medications having run out one week ago. He also needs glucometer chem strips. He is compliant with a statin in view of his increased cardiovascular risk. Current Outpatient Prescriptions   Medication Sig Dispense Refill    naproxen (NAPROSYN) 500 mg tablet Take 1 Tab by mouth two (2) times daily (with meals). 60 Tab 1    metFORMIN (GLUCOPHAGE) 500 mg tablet TAKE 2 TABLETS BY MOUTH TWICE DAILY 120 Tab 12    glipiZIDE (GLUCOTROL) 10 mg tablet Take 1 Tab by mouth two (2) times a day. 60 Tab 12    pioglitazone (ACTOS) 45 mg tablet Take 1 Tab by mouth daily. 30 Tab 12    atorvastatin (LIPITOR) 80 mg tablet Take 1 Tab by mouth nightly. 30 Tab 11    lisinopril (PRINIVIL, ZESTRIL) 10 mg tablet TAKE 1 TABLET BY MOUTH EVERY DAY 30 Tab 11    sildenafil citrate (VIAGRA) 100 mg tablet Take 1 Tab by mouth as needed. 4 Tab 12    Blood-Glucose Meter monitoring kit Use twice a day 1 Kit 0    insulin glargine-lixisenatide (SOLIQUA 100/33) 100 unit-33 mcg/mL inpn INJECT 15 UNITS DAILY 5 Pen 11    Insulin Needles, Disposable, 31 gauge x 5/16\" ndle Use to inject insulin once daily.  100 Pen Needle 11    Blood-Glucose Meter (ONETOUCH ULTRA2) monitoring kit Use to test blood sugar once a day 1 Kit 0    glucose blood VI test strips (ONETOUCH ULTRA TEST) strip Use to test blood sugar once daily 50 Strip 11    Lancets misc Use to test blood sugar once daily 100 Each 11     Past Medical History:   Diagnosis Date    Callous ulcer ([facility] [address] 4/13/2011    Edema 4/13/2011    Hypertension 4/13/2011     History reviewed. No pertinent surgical history. No Known Allergies      REVIEW OF SYSTEMS:  General: negative for - chills or fever  ENT: negative for - headaches, nasal congestion or tinnitus  Respiratory: negative for - cough, hemoptysis, shortness of breath or wheezing  Cardiovascular : negative for - chest pain, edema, palpitations or shortness of breath  Gastrointestinal: negative for - abdominal pain, blood in stools, heartburn or nausea/vomiting  Genito-Urinary: no dysuria, trouble voiding, or hematuria  Musculoskeletal: negative for - gait disturbance, joint pain, joint stiffness or joint swelling  Neurological: no TIA or stroke symptoms  Hematologic: no bruises, no bleeding, no swollen glands  Integument: no lumps, mole changes, nail changes or rash  Endocrine: no malaise/lethargy or unexpected weight changes      Social History     Social History    Marital status:      Spouse name: N/A    Number of children: N/A    Years of education: N/A     Social History Main Topics    Smoking status: Current Every Day Smoker     Packs/day: 0.25     Years: 45.00    Smokeless tobacco: Never Used    Alcohol use Yes    Drug use: No    Sexual activity: Yes     Partners: Female     Other Topics Concern    None     Social History Narrative     Family History   Problem Relation Age of Onset    Diabetes Mother        OBJECTIVE:    Visit Vitals    /66 (BP 1 Location: Right arm, BP Patient Position: Sitting)    Pulse 63    Temp 97.8 °F (36.6 °C) (Oral)    Resp 16    Ht 5' 9\" (1.753 m)    Wt 181 lb 12.8 oz (82.5 kg)    SpO2 93%    BMI 26.85 kg/m2     CONSTITUTIONAL: well , well nourished, appears age appropriate  EYES: perrla, eom intact  ENMT:moist mucous membranes, pharynx clear  NECK: supple. Thyroid normal  RESPIRATORY: Chest: clear to ascultation and percussion   CARDIOVASCULAR: Heart: regular rate and rhythm  GASTROINTESTINAL: Abdomen: soft, bowel sounds active  HEMATOLOGIC: no pathological lymph nodes palpated  MUSCULOSKELETAL: Extremities: no edema, pulse 1+   INTEGUMENT: No unusual rashes or suspicious skin lesions noted. Nails appear normal, one large wart right foot with several calluses lateral aspect left foot  NEUROLOGIC: non-focal exam   MENTAL STATUS: alert and oriented, appropriate affect      ASSESSMENT:  1. Plantar wart    2. Chronic obstructive pulmonary disease, unspecified COPD type ([facility]    3. Controlled type 2 diabetes mellitus without complication, without long-term current use of insulin ([facility]    4. Essential hypertension    5. Dyslipidemia      Diagnoses and all orders for this visit:    1. Plantar wart  -     REFERRAL TO PODIATRY    2. Chronic obstructive pulmonary disease, unspecified COPD type ([facility]  Assessment & Plan:  Stable, based on history, physical exam and review of pertinent labs, studies and medications; meds reconciled; lifestyle modifications recommended. Key COPD Medications     Patient is on no COPD/Asthma meds. Lab Results   Component Value Date/Time    WBC 7.3 10/27/2017 12:27 PM    HGB 16.9 10/27/2017 12:27 PM    HCT 48.2 10/27/2017 12:27 PM    PLATELET 272 62/63/3626 12:27 PM         3. Controlled type 2 diabetes mellitus without complication, without long-term current use of insulin (HCC)  Assessment & Plan:  Stable, based on history, physical exam and review of pertinent labs, studies and medications; meds reconciled; lifestyle modifications recommended, medication compliance emphasized. Key Antihyperglycemic Medications             metFORMIN (GLUCOPHAGE) 500 mg tablet  (Taking) TAKE 2 TABLETS BY MOUTH TWICE DAILY    glipiZIDE (GLUCOTROL) 10 mg tablet  (Taking) Take 1 Tab by mouth two (2) times a day.     pioglitazone (ACTOS) 45 mg tablet  (Taking) Take 1 Tab by mouth daily. Other Key Diabetic Medications             atorvastatin (LIPITOR) 80 mg tablet  (Taking) Take 1 Tab by mouth nightly. lisinopril (PRINIVIL, ZESTRIL) 10 mg tablet  (Taking) TAKE 1 TABLET BY MOUTH EVERY DAY        Lab Results   Component Value Date/Time    Hemoglobin A1c 10.3 02/02/2018 11:31 AM    Hemoglobin A1c (POC) 9.4 05/26/2017 11:53 AM    Glucose 163 10/27/2017 12:27 PM    Creatinine 1.05 10/27/2017 12:27 PM    Cholesterol, total 172 10/27/2017 12:27 PM    HDL Cholesterol 38 10/27/2017 12:27 PM    LDL, calculated 118 10/27/2017 12:27 PM    Triglyceride 82 10/27/2017 12:27 PM     Diabetic Foot and Eye Exam HM Status   Topic Date Due    Eye Exam  09/14/1965    Diabetic Foot Care  05/26/2018       Orders:  -     HEMOGLOBIN A1C WITH EAG  -     metFORMIN (GLUCOPHAGE) 500 mg tablet; TAKE 2 TABLETS BY MOUTH TWICE DAILY  -     glipiZIDE (GLUCOTROL) 10 mg tablet; Take 1 Tab by mouth two (2) times a day. -     pioglitazone (ACTOS) 45 mg tablet; Take 1 Tab by mouth daily. 4. Essential hypertension    5. Dyslipidemia  -     APOLIPOPROTEIN B    Other orders  -     naproxen (NAPROSYN) 500 mg tablet; Take 1 Tab by mouth two (2) times daily (with meals). PLAN:    1. He appears to have a plantar wart on the dorsal aspect of his right foot. He will see a podiatrist regarding this. 2. He does have COPD but his upper respiratory infection is actually improving nicely. Nothing more need be done other than cessation of cigarette smoking. 3. I am not entirely sure how his diabetes is doing. His last hemoglobin A1c was 8.5. He is maxed out on three different agents. If this trend continues, he will have to be on insulin. 4. Blood pressure is excellent today, no adjustments are made. 5. He is at an increased cardiovascular risk in view of his age and existing comorbidities. Efficacy and compliance will be assessed.     6. Nothing need be done for the upper respiratory infection. .  Orders Placed This Encounter    HEMOGLOBIN A1C WITH EAG    APOLIPOPROTEIN B    REFERRAL TO PODIATRY    metFORMIN (GLUCOPHAGE) 500 mg tablet    glipiZIDE (GLUCOTROL) 10 mg tablet    pioglitazone (ACTOS) 45 mg tablet    naproxen (NAPROSYN) 500 mg tablet         Follow-up Disposition:  Return in about 3 months (around 5/2/2018).       Diane Garza MD

## 2018-02-02 NOTE — MR AVS SNAPSHOT
[address] [facility] 
 
 
 [address] [address] 
[phone] Patient: Augustin Foster MRN: [id number] Visit Information Date & Time Provider Department Dept. Phone Encounter #  
 2/2/2018  9:45 AM Melvin Hobbs MD [facility] [phone] [id number] Your Appointments 6/4/2018  9:45 AM  
Any with Melvin Hobbs MD  
[address] [place] [address] and Primary Care [facility] Appt Note: follow up  
 [address] [facility]  
  
   
 [id number] Upcoming Health Maintenance Date Due FOBT Q 1 YEAR AGE 50-75 2/11/2017 EYE EXAM RETINAL OR DILATED Q1 2/27/2018* HEMOGLOBIN A1C Q6M 4/27/2018 FOOT EXAM Q1 5/26/2018 MICROALBUMIN Q1 5/26/2018 LIPID PANEL Q1 10/27/2018 DTaP/Tdap/Td series (2 - Td) 10/27/2027 *Topic was postponed. The date shown is not the original due date. Allergies as of 2/2/2018  Review Complete On: 2/2/2018 By: Jaycee Robles No Known Allergies Current Immunizations  Reviewed on 11/24/2014 Name Date Influenza Vaccine Intradermal PF 11/24/2014 Not reviewed this visit You Were Diagnosed With   
  
 Codes Comments Plantar wart    -  Primary ICD-10-CM: B07.0 ICD-9-CM: 078.12 Chronic obstructive pulmonary disease, unspecified COPD type ([facility]     ICD-10-CM: J44.9 ICD-9-CM: 394 Controlled type 2 diabetes mellitus without complication, without long-term current use of insulin ([facility] [facility]     ICD-10-CM: E11.9 ICD-9-CM: 250.00 Essential hypertension     ICD-10-CM: I10 
ICD-9-CM: 401.9 Dyslipidemia     ICD-10-CM: E78.5 ICD-9-CM: 272.4 Essential hypertension with goal blood pressure less than 130/85     ICD-10-CM: I10 
ICD-9-CM: 401.9 Vitals BP Pulse Temp Resp Height(growth percentile) Weight(growth percentile) 112/66 (BP 1 Location: Right arm, BP Patient Position: Sitting) 63 97.8 °F (36.6 °C) (Oral) 16 5' 9\" (1.753 m) 181 lb 12.8 oz (82.5 kg) SpO2 BMI Smoking Status 93% 26.85 kg/m2 Current Every Day Smoker Vitals History BMI and BSA Data Body Mass Index Body Surface Area  
 26.85 kg/m 2 2 m 2 Preferred Pharmacy Pharmacy Name Phone [facility], [address] [phone] Your Updated Medication List  
  
   
This list is accurate as of: 2/2/18 11:45 AM.  Always use your most recent med list.  
  
  
  
  
 atorvastatin 80 mg tablet Commonly known as:  LIPITOR Take 1 Tab by mouth nightly. Blood-Glucose Meter monitoring kit Use twice a day  
  
 glipiZIDE 10 mg tablet Commonly known as:  Evie Correia Take 1 Tab by mouth two (2) times a day. lisinopril 10 mg tablet Commonly known as:  PRINIVIL, ZESTRIL  
TAKE 1 TABLET BY MOUTH EVERY DAY  
  
 metFORMIN 500 mg tablet Commonly known as:  GLUCOPHAGE  
TAKE 2 TABLETS BY MOUTH TWICE DAILY pioglitazone 45 mg tablet Commonly known as:  ACTOS Take 1 Tab by mouth daily. sildenafil citrate 100 mg tablet Commonly known as:  VIAGRA Take 1 Tab by mouth as needed. We Performed the Following APOLIPOPROTEIN B [id number] CPT(R)] HEMOGLOBIN A1C WITH EAG [95548 CPT(R)] Introducing [facility] SERVICES! Dear Russ Land: Thank you for requesting a [employer] account. Our records indicate that you have previously registered for a [employer] account but its currently inactive. Please call our [employer] support line at [phone]. Additional Information If you have questions, please visit the Frequently Asked Questions section of the [employer] website at [email or web address] Remember, [employer] is NOT to be used for urgent needs. For medical emergencies, dial 911. Now available from your iPhone and Android! Please provide this summary of care documentation to your next provider. If you have any questions after today's visit, please call [phone].

## 2018-02-02 NOTE — LETTER
NOTIFICATION RETURN TO WORK / SCHOOL 
 
2/2/2018 11:48 AM 
 
Mr. Lore Suarez [address] [facility] [phone] To Whom It May Concern: 
 
Lore Suarez is currently under the care of Edil Pablo. He will return to work on: Tuesday 02/06/18. If there are questions or concerns please have the patient contact our office. Sincerely, Slava Kaur MD

## 2018-02-02 NOTE — PROGRESS NOTES
Chief Complaint   Patient presents with    Hip Pain    Cold     1. Have you been to the ER, urgent care clinic since your last visit? Hospitalized since your last visit? No    2. Have you seen or consulted any other health care providers outside of the [address] [place] [address] since your last visit? Include any pap smears or colon screening.  No

## 2018-02-03 LAB
APO B SERPL-MCNC: 119 MG/DL (ref 52–135)
EST. AVERAGE GLUCOSE BLD GHB EST-MCNC: 249 MG/DL
HBA1C MFR BLD: 10.3 % (ref 4.8–5.6)

## 2018-02-03 NOTE — PROGRESS NOTES
Start Sherryle Kocher at 15 units daily. He will DC the glipizide and continue metformin and Actos. Needs to be sent for instruction in injection. Reminder that he must eat at the same time every day. Will need a coupon.   See me with any questions

## 2018-02-08 RX ORDER — PEN NEEDLE, DIABETIC 30 GX3/16"
NEEDLE, DISPOSABLE MISCELLANEOUS
Qty: 100 PEN NEEDLE | Refills: 11 | Status: SHIPPED | OUTPATIENT
Start: 2018-02-08 | End: 2019-01-14 | Stop reason: SDUPTHER

## 2018-02-08 NOTE — TELEPHONE ENCOUNTER
PATIENT NOTIFIED BY PHONE AND ASK TO COME BY OFFICE FOR A COUPON FOR [address] [place] [address]. PATIENT TOLD OF ELEVATED A1C. PER DR. CHAVEZ PATIENT TO START TAKING 15 UNITS DAILY. HE IS TO D/C THE GLIPIZIDE, AND CONTINUE THE METFORMIN AND ACTOS. PATIENT TO COME BY OFFICE WHEN FILLED AND WILL BE SHOWN HOW TO USE THE PEN. PATIENT ASK TO MAKE SURE HE EAT AT SAME TIME EVERY DAY.

## 2018-02-16 ENCOUNTER — TELEPHONE (OUTPATIENT)
Dept: INTERNAL MEDICINE CLINIC | Age: 63
End: 2018-02-16

## 2018-02-16 NOTE — TELEPHONE ENCOUNTER
Patient insurance will not cover the [place] or [facility]. Per Dr. Daniel Price patient ask to start Novolog 70/30 8 units every morning. Patient told the importance of eating on time and at the same time everyday. Patient ask to check blood sugars ac breakfast and dinner and call me every 3 days.

## 2018-02-28 PROBLEM — J44.9 CHRONIC OBSTRUCTIVE PULMONARY DISEASE (HCC): Status: ACTIVE | Noted: 2018-02-28

## 2018-02-28 PROBLEM — E78.5 DYSLIPIDEMIA: Status: ACTIVE | Noted: 2018-02-28

## 2018-02-28 RX ORDER — NAPROXEN 500 MG/1
500 TABLET ORAL 2 TIMES DAILY WITH MEALS
Qty: 60 TAB | Refills: 1 | Status: SHIPPED | OUTPATIENT
Start: 2018-02-28

## 2018-03-01 NOTE — ASSESSMENT & PLAN NOTE
Stable, based on history, physical exam and review of pertinent labs, studies and medications; meds reconciled; lifestyle modifications recommended, medication compliance emphasized. Key Antihyperglycemic Medications             metFORMIN (GLUCOPHAGE) 500 mg tablet  (Taking) TAKE 2 TABLETS BY MOUTH TWICE DAILY    glipiZIDE (GLUCOTROL) 10 mg tablet  (Taking) Take 1 Tab by mouth two (2) times a day. pioglitazone (ACTOS) 45 mg tablet  (Taking) Take 1 Tab by mouth daily. Other Key Diabetic Medications             atorvastatin (LIPITOR) 80 mg tablet  (Taking) Take 1 Tab by mouth nightly.     lisinopril (PRINIVIL, ZESTRIL) 10 mg tablet  (Taking) TAKE 1 TABLET BY MOUTH EVERY DAY        Lab Results   Component Value Date/Time    Hemoglobin A1c 10.3 02/02/2018 11:31 AM    Hemoglobin A1c (POC) 9.4 05/26/2017 11:53 AM    Glucose 163 10/27/2017 12:27 PM    Creatinine 1.05 10/27/2017 12:27 PM    Cholesterol, total 172 10/27/2017 12:27 PM    HDL Cholesterol 38 10/27/2017 12:27 PM    LDL, calculated 118 10/27/2017 12:27 PM    Triglyceride 82 10/27/2017 12:27 PM     Diabetic Foot and Eye Exam HM Status   Topic Date Due    Eye Exam  09/14/1965    Diabetic Foot Care  05/26/2018

## 2018-03-01 NOTE — ASSESSMENT & PLAN NOTE
Stable, based on history, physical exam and review of pertinent labs, studies and medications; meds reconciled; lifestyle modifications recommended. Key COPD Medications     Patient is on no COPD/Asthma meds.         Lab Results   Component Value Date/Time    WBC 7.3 10/27/2017 12:27 PM    HGB 16.9 10/27/2017 12:27 PM    HCT 48.2 10/27/2017 12:27 PM    PLATELET 713 36/02/8545 12:27 PM

## 2018-03-15 ENCOUNTER — TELEPHONE (OUTPATIENT)
Dept: INTERNAL MEDICINE CLINIC | Age: 63
End: 2018-03-15

## 2018-03-20 ENCOUNTER — OFFICE VISIT (OUTPATIENT)
Dept: INTERNAL MEDICINE CLINIC | Age: 63
End: 2018-03-20

## 2018-03-20 VITALS
TEMPERATURE: 98.2 F | HEIGHT: 69 IN | HEART RATE: 59 BPM | OXYGEN SATURATION: 95 % | DIASTOLIC BLOOD PRESSURE: 70 MMHG | RESPIRATION RATE: 18 BRPM | SYSTOLIC BLOOD PRESSURE: 149 MMHG | BODY MASS INDEX: 27.11 KG/M2 | WEIGHT: 183 LBS

## 2018-03-20 DIAGNOSIS — E11.9 CONTROLLED TYPE 2 DIABETES MELLITUS WITHOUT COMPLICATION, WITHOUT LONG-TERM CURRENT USE OF INSULIN (HCC): Primary | ICD-10-CM

## 2018-03-20 DIAGNOSIS — I10 ESSENTIAL HYPERTENSION: ICD-10-CM

## 2018-03-20 DIAGNOSIS — J44.9 CHRONIC OBSTRUCTIVE PULMONARY DISEASE, UNSPECIFIED COPD TYPE (HCC): ICD-10-CM

## 2018-03-20 DIAGNOSIS — E78.5 DYSLIPIDEMIA: ICD-10-CM

## 2018-03-20 NOTE — PROGRESS NOTES
Chief Complaint   Patient presents with    Medication Evaluation     Patient would like to discuss insulin prescription. 1. Have you been to the ER, urgent care clinic since your last visit? Hospitalized since your last visit? No    2. Have you seen or consulted any other health care providers outside of the [address] [place] [address] since your last visit? Include any pap smears or colon screening.  No

## 2018-03-20 NOTE — MR AVS SNAPSHOT
[address] [facility] 
 
 
 [address] [id number] 
[phone] Patient: Bruno Martinez MRN: [id number] LID:[id number] Visit Information Date & Time Provider Department Dept. Phone Encounter #  
 3/20/2018  4:00 PM Margaret Jimenez MD [facility] [phone] Your Appointments 5/21/2018  9:15 AM  
Any with Margaret Jimenez MD  
[address] [place] [address] and Primary Care [facility] Appt Note: follow up  
 Edil Hi [address] [facility]  
  
   
 [address] [id number] Upcoming Health Maintenance Date Due  
 EYE EXAM RETINAL OR DILATED Q1 4/20/2018* FOBT Q 1 YEAR AGE 50-75 4/20/2018* FOOT EXAM Q1 5/26/2018 MICROALBUMIN Q1 5/26/2018 HEMOGLOBIN A1C Q6M 8/2/2018 LIPID PANEL Q1 10/27/2018 DTaP/Tdap/Td series (2 - Td) 10/27/2027 *Topic was postponed. The date shown is not the original due date. Allergies as of 3/20/2018  Review Complete On: 2/28/2018 By: Margaret Jimenez MD  
 No Known Allergies Current Immunizations  Reviewed on 11/24/2014 Name Date Influenza Vaccine Intradermal PF 11/24/2014 Not reviewed this visit Vitals BP Pulse Temp Resp Height(growth percentile) Weight(growth percentile) 149/70 (BP 1 Location: Right arm, BP Patient Position: Sitting) (!) 59 98.2 °F (36.8 °C) (Oral) 18 5' 9\" (1.753 m) 183 lb (83 kg) SpO2 BMI Smoking Status 95% 27.02 kg/m2 Current Every Day Smoker Vitals History BMI and BSA Data Body Mass Index Body Surface Area  
 27.02 kg/m 2 2.01 m 2 Preferred Pharmacy Pharmacy Name Phone [phone] Your Updated Medication List  
  
   
This list is accurate as of 3/20/18  5:36 PM.  Always use your most recent med list.  
  
  
  
  
 atorvastatin 80 mg tablet Commonly known as:  LIPITOR Take 1 Tab by mouth nightly. * Blood-Glucose Meter monitoring kit Use twice a day * Blood-Glucose Meter monitoring kit Commonly known as:  Rema Byersch Use to test blood sugar once a day  
  
 glipiZIDE 10 mg tablet Commonly known as:  Vamsi Gain Take 1 Tab by mouth two (2) times a day. glucose blood VI test strips strip Commonly known as:  ONETOUCH ULTRA TEST Use to test blood sugar once daily  
  
 insulin glargine-lixisenatide 100 unit-33 mcg/mL Inpn Commonly known as:  SOLIQUA 100/33 INJECT 15 UNITS DAILY Insulin Needles (Disposable) 31 gauge x 5/16\" Ndle Use to inject insulin once daily. Lancets Misc Use to test blood sugar once daily  
  
 lisinopril 10 mg tablet Commonly known as:  PRINIVIL, ZESTRIL  
TAKE 1 TABLET BY MOUTH EVERY DAY  
  
 metFORMIN 500 mg tablet Commonly known as:  GLUCOPHAGE  
TAKE 2 TABLETS BY MOUTH TWICE DAILY  
  
 naproxen 500 mg tablet Commonly known as:  NAPROSYN Take 1 Tab by mouth two (2) times daily (with meals). pioglitazone 45 mg tablet Commonly known as:  ACTOS Take 1 Tab by mouth daily. sildenafil citrate 100 mg tablet Commonly known as:  VIAGRA Take 1 Tab by mouth as needed. * Notice: This list has 2 medication(s) that are the same as other medications prescribed for you. Read the directions carefully, and ask your doctor or other care provider to review them with you. Introducing [facility]! Dear Supriya Khan: Thank you for requesting a uma information technology account. Our records indicate that you have previously registered for a uma information technology account but its currently inactive. Please call our uma information technology support line at [phone]. Additional Information If you have questions, please visit the Frequently Asked Questions section of the uma information technology website at [email or web address] Remember, MyChart is NOT to be used for urgent needs. For medical emergencies, dial 911. Now available from your iPhone and Android! Please provide this summary of care documentation to your next provider. Your primary care clinician is listed as Saul Izaguirre. If you have any questions after today's visit, please call [phone].

## 2018-03-21 NOTE — PROGRESS NOTES
[address] [facility] and Primary [facility]  [place] [address]  [address] [place] [address]  Phone:  [phone]  Fax: [phone]       Chief Complaint   Patient presents with    Medication Evaluation     Patient would like to discuss insulin prescription. .      SUBJECTIVE:    Marianne Pereyra is a 60 y.o. male comes in for return visit. The patient never got the combination insulin and GLP-1 agonist.  The cost was an issue. In reality, the actual charge is about $90. In any event as far as his oral agents are concerned, he is only taking half of the dosing. He and his wife admit to dietary indiscretion with the consumption of a lot of fatty items as well as processed carbohydrates. He continues to smoke cigarettes. He has existing COPD. He has a past history of primary hypertension and dyslipidemia. Current Outpatient Prescriptions   Medication Sig Dispense Refill    naproxen (NAPROSYN) 500 mg tablet Take 1 Tab by mouth two (2) times daily (with meals). 60 Tab 1    Insulin Needles, Disposable, 31 gauge x 5/16\" ndle Use to inject insulin once daily. 100 Pen Needle 11    metFORMIN (GLUCOPHAGE) 500 mg tablet TAKE 2 TABLETS BY MOUTH TWICE DAILY 120 Tab 12    glipiZIDE (GLUCOTROL) 10 mg tablet Take 1 Tab by mouth two (2) times a day. 60 Tab 12    pioglitazone (ACTOS) 45 mg tablet Take 1 Tab by mouth daily. 30 Tab 12    Blood-Glucose Meter (ONETOUCH ULTRA2) monitoring kit Use to test blood sugar once a day 1 Kit 0    glucose blood VI test strips (ONETOUCH ULTRA TEST) strip Use to test blood sugar once daily 50 Strip 11    Lancets misc Use to test blood sugar once daily 100 Each 11    atorvastatin (LIPITOR) 80 mg tablet Take 1 Tab by mouth nightly. 30 Tab 11    lisinopril (PRINIVIL, ZESTRIL) 10 mg tablet TAKE 1 TABLET BY MOUTH EVERY DAY 30 Tab 11    sildenafil citrate (VIAGRA) 100 mg tablet Take 1 Tab by mouth as needed.  4 Tab 12    Blood-Glucose Meter monitoring kit Use twice a day 1 Kit 0    insulin glargine-lixisenatide (SOLIQUA 100/33) 100 unit-33 mcg/mL inpn INJECT 15 UNITS DAILY 5 Pen 11     Past Medical History:   Diagnosis Date    Callous ulcer ([address] 4/13/2011    Edema 4/13/2011    Hypertension 4/13/2011     History reviewed. No pertinent surgical history.   No Known Allergies      REVIEW OF SYSTEMS:  General: negative for - chills or fever  ENT: negative for - headaches, nasal congestion or tinnitus  Respiratory: negative for - cough, hemoptysis, shortness of breath or wheezing  Cardiovascular : negative for - chest pain, edema, palpitations or shortness of breath  Gastrointestinal: negative for - abdominal pain, blood in stools, heartburn or nausea/vomiting  Genito-Urinary: no dysuria, trouble voiding, or hematuria  Musculoskeletal: negative for - gait disturbance, joint pain, joint stiffness or joint swelling  Neurological: no TIA or stroke symptoms  Hematologic: no bruises, no bleeding, no swollen glands  Integument: no lumps, mole changes, nail changes or rash  Endocrine: no malaise/lethargy or unexpected weight changes      Social History     Social History    Marital status:      Spouse name: N/A    Number of children: N/A    Years of education: N/A     Social History Main Topics    Smoking status: Current Every Day Smoker     Packs/day: 0.25     Years: 45.00    Smokeless tobacco: Never Used    Alcohol use Yes    Drug use: No    Sexual activity: Yes     Partners: Female     Other Topics Concern    None     Social History Narrative     Family History   Problem Relation Age of Onset    Diabetes Mother        OBJECTIVE:    Visit Vitals    /70 (BP 1 Location: Right arm, BP Patient Position: Sitting)    Pulse (!) 59    Temp 98.2 °F (36.8 °C) (Oral)    Resp 18    Ht 5' 9\" (1.753 m)    Wt 183 lb (83 kg)    SpO2 95%    BMI 27.02 kg/m2     CONSTITUTIONAL: well , well nourished, appears age appropriate  EYES: perrla, eom intact  ENMT:moist mucous membranes, pharynx clear  NECK: supple. Thyroid normal  RESPIRATORY: Chest: clear to ascultation and percussion   CARDIOVASCULAR: Heart: regular rate and rhythm  GASTROINTESTINAL: Abdomen: soft, bowel sounds active  HEMATOLOGIC: no pathological lymph nodes palpated  MUSCULOSKELETAL: Extremities: no edema, pulse 1+   INTEGUMENT: No unusual rashes or suspicious skin lesions noted. Nails appear normal.  NEUROLOGIC: non-focal exam   MENTAL STATUS: alert and oriented, appropriate affect      ASSESSMENT:  1. Controlled type 2 diabetes mellitus without complication, without long-term current use of insulin ([address]    2. Dyslipidemia    3. Essential hypertension    4. Chronic obstructive pulmonary disease, unspecified COPD type ([address]        PLAN:    1. The patient's diabetes is poorly-controlled but I will not start the insulin/GLP-1 agonist just yet. He is told to take his medications as prescribed. Specifically, he was only taking half the dose of the metformin as was the case with the Glipizide. He will take the full doses. He will also continue the Actos at 45 mg every day. If he is not at goal on his return visit, then the combination  medication will be given. I remind him and his wife that it is important that he adhere to his fat/carbohydrate restricted diet. 2. He will continue statin as prescribed in view of his increased cardiovascular risk. 3. Blood pressure is excellent today. 4. I again encouraged the patient to discontinue cigarette smoking in view of his COPD and the added cardiovascular risk created by this usage. .  Orders Placed This Encounter    FRUCTOSAMINE         Follow-up Disposition:  Return in about 2 months (around 5/20/2018).       Simon Michael MD

## 2018-03-23 LAB — FRUCTOSAMINE SERPL-SCNC: 262 UMOL/L (ref 0–285)

## 2018-12-20 DIAGNOSIS — E11.9 CONTROLLED TYPE 2 DIABETES MELLITUS WITHOUT COMPLICATION, WITHOUT LONG-TERM CURRENT USE OF INSULIN (HCC): ICD-10-CM

## 2018-12-20 DIAGNOSIS — I10 ESSENTIAL HYPERTENSION WITH GOAL BLOOD PRESSURE LESS THAN 130/85: ICD-10-CM

## 2018-12-20 RX ORDER — ATORVASTATIN CALCIUM 80 MG/1
80 TABLET, FILM COATED ORAL
Qty: 30 TAB | Refills: 11 | Status: SHIPPED | OUTPATIENT
Start: 2018-12-20 | End: 2019-01-08 | Stop reason: SDUPTHER

## 2019-01-06 ENCOUNTER — HOSPITAL ENCOUNTER (EMERGENCY)
Age: 64
Discharge: HOME | End: 2019-01-07
Attending: EMERGENCY MEDICINE
Payer: COMMERCIAL

## 2019-01-06 DIAGNOSIS — R73.9 HYPERGLYCEMIA: Primary | ICD-10-CM

## 2019-01-06 LAB
ALBUMIN SERPL-MCNC: 3.3 G/DL (ref 3.5–5)
ALBUMIN/GLOB SERPL: 0.8 {RATIO} (ref 1.1–2.2)
ALP SERPL-CCNC: 154 U/L (ref 45–117)
ALT SERPL-CCNC: 25 U/L (ref 12–78)
ANION GAP SERPL CALC-SCNC: 6 MMOL/L (ref 5–15)
AST SERPL-CCNC: 23 U/L (ref 15–37)
BASOPHILS # BLD: 0.1 K/UL (ref 0–0.1)
BASOPHILS NFR BLD: 1 % (ref 0–1)
BILIRUB SERPL-MCNC: 0.6 MG/DL (ref 0.2–1)
BUN SERPL-MCNC: 11 MG/DL (ref 6–20)
BUN/CREAT SERPL: 10 (ref 12–20)
CALCIUM SERPL-MCNC: 9.5 MG/DL (ref 8.5–10.1)
CHLORIDE SERPL-SCNC: 102 MMOL/L (ref 97–108)
CO2 SERPL-SCNC: 28 MMOL/L (ref 21–32)
CREAT SERPL-MCNC: 1.09 MG/DL (ref 0.7–1.3)
DIFFERENTIAL METHOD BLD: NORMAL
EOSINOPHIL # BLD: 0.1 K/UL (ref 0–0.4)
EOSINOPHIL NFR BLD: 2 % (ref 0–7)
ERYTHROCYTE [DISTWIDTH] IN BLOOD BY AUTOMATED COUNT: 13.2 % (ref 11.5–14.5)
GLOBULIN SER CALC-MCNC: 4.3 G/DL (ref 2–4)
GLUCOSE BLD STRIP.AUTO-MCNC: 316 MG/DL (ref 65–100)
GLUCOSE SERPL-MCNC: 330 MG/DL (ref 65–100)
HCT VFR BLD AUTO: 48.2 % (ref 36.6–50.3)
HGB BLD-MCNC: 17 G/DL (ref 12.1–17)
IMM GRANULOCYTES # BLD: 0 K/UL (ref 0–0.04)
IMM GRANULOCYTES NFR BLD AUTO: 0 % (ref 0–0.5)
LYMPHOCYTES # BLD: 2.5 K/UL (ref 0.8–3.5)
LYMPHOCYTES NFR BLD: 38 % (ref 12–49)
MCH RBC QN AUTO: 31 PG (ref 26–34)
MCHC RBC AUTO-ENTMCNC: 35.3 G/DL (ref 30–36.5)
MCV RBC AUTO: 87.8 FL (ref 80–99)
MONOCYTES # BLD: 0.4 K/UL (ref 0–1)
MONOCYTES NFR BLD: 6 % (ref 5–13)
NEUTS SEG # BLD: 3.4 K/UL (ref 1.8–8)
NEUTS SEG NFR BLD: 52 % (ref 32–75)
NRBC # BLD: 0 K/UL (ref 0–0.01)
NRBC BLD-RTO: 0 PER 100 WBC
PLATELET # BLD AUTO: 240 K/UL (ref 150–400)
PMV BLD AUTO: 10.6 FL (ref 8.9–12.9)
POTASSIUM SERPL-SCNC: 4.4 MMOL/L (ref 3.5–5.1)
PROT SERPL-MCNC: 7.6 G/DL (ref 6.4–8.2)
RBC # BLD AUTO: 5.49 M/UL (ref 4.1–5.7)
SERVICE CMNT-IMP: ABNORMAL
SODIUM SERPL-SCNC: 136 MMOL/L (ref 136–145)
WBC # BLD AUTO: 6.6 K/UL (ref 4.1–11.1)

## 2019-01-06 PROCEDURE — 82962 GLUCOSE BLOOD TEST: CPT

## 2019-01-06 PROCEDURE — 80053 COMPREHEN METABOLIC PANEL: CPT

## 2019-01-06 PROCEDURE — 96360 HYDRATION IV INFUSION INIT: CPT

## 2019-01-06 PROCEDURE — 74011250636 HC RX REV CODE- 250/636: Performed by: EMERGENCY MEDICINE

## 2019-01-06 PROCEDURE — 99285 EMERGENCY DEPT VISIT HI MDM: CPT

## 2019-01-06 PROCEDURE — 36415 COLL VENOUS BLD VENIPUNCTURE: CPT

## 2019-01-06 PROCEDURE — 85025 COMPLETE CBC W/AUTO DIFF WBC: CPT

## 2019-01-06 RX ADMIN — SODIUM CHLORIDE 1000 ML: 900 INJECTION, SOLUTION INTRAVENOUS at 23:07

## 2019-01-07 VITALS
HEART RATE: 55 BPM | RESPIRATION RATE: 17 BRPM | HEIGHT: 69 IN | WEIGHT: 175.93 LBS | TEMPERATURE: 98.1 F | SYSTOLIC BLOOD PRESSURE: 143 MMHG | OXYGEN SATURATION: 98 % | BODY MASS INDEX: 26.06 KG/M2 | DIASTOLIC BLOOD PRESSURE: 75 MMHG

## 2019-01-07 DIAGNOSIS — I10 ESSENTIAL HYPERTENSION WITH GOAL BLOOD PRESSURE LESS THAN 130/85: ICD-10-CM

## 2019-01-07 DIAGNOSIS — E11.9 CONTROLLED TYPE 2 DIABETES MELLITUS WITHOUT COMPLICATION, WITHOUT LONG-TERM CURRENT USE OF INSULIN (HCC): ICD-10-CM

## 2019-01-07 LAB
APPEARANCE UR: CLEAR
BACTERIA URNS QL MICRO: NEGATIVE /HPF
BILIRUB UR QL: NEGATIVE
COLOR UR: ABNORMAL
EPITH CASTS URNS QL MICRO: ABNORMAL /LPF
GLUCOSE BLD STRIP.AUTO-MCNC: 268 MG/DL (ref 65–100)
GLUCOSE UR STRIP.AUTO-MCNC: >1000 MG/DL
HGB UR QL STRIP: NEGATIVE
HYALINE CASTS URNS QL MICRO: ABNORMAL /LPF (ref 0–5)
KETONES UR QL STRIP.AUTO: NEGATIVE MG/DL
LEUKOCYTE ESTERASE UR QL STRIP.AUTO: NEGATIVE
NITRITE UR QL STRIP.AUTO: NEGATIVE
PH UR STRIP: 6.5 [PH] (ref 5–8)
PROT UR STRIP-MCNC: 100 MG/DL
RBC #/AREA URNS HPF: ABNORMAL /HPF (ref 0–5)
SERVICE CMNT-IMP: ABNORMAL
SP GR UR REFRACTOMETRY: 1.01 (ref 1–1.03)
UA: UC IF INDICATED,UAUC: ABNORMAL
UROBILINOGEN UR QL STRIP.AUTO: 1 EU/DL (ref 0.2–1)
WBC URNS QL MICRO: ABNORMAL /HPF (ref 0–4)

## 2019-01-07 PROCEDURE — 81001 URINALYSIS AUTO W/SCOPE: CPT

## 2019-01-07 PROCEDURE — 82962 GLUCOSE BLOOD TEST: CPT

## 2019-01-07 NOTE — DISCHARGE INSTRUCTIONS
Patient Education        Learning About High Blood Sugar  What is high blood sugar? Your body turns the food you eat into glucose (sugar), which it uses for energy. But if your body isn't able to use the sugar right away, it can build up in your blood and lead to high blood sugar. When the amount of sugar in your blood stays too high for too much of the time, you may have diabetes. Diabetes is a disease that can cause serious health problems. The good news is that lifestyle changes may help you get your blood sugar back to normal and avoid or delay diabetes. What causes high blood sugar? Sugar (glucose) can build up in your blood if you:  · Are overweight. · Have a family history of diabetes. · Take certain medicines, such as steroids. What are the symptoms? Having high blood sugar may not cause any symptoms at all. Or it may make you feel very thirsty or very hungry. You may also urinate more often than usual, have blurry vision, or lose weight without trying. How is high blood sugar treated? You can take steps to lower your blood sugar level if you understand what makes it get higher. Your doctor may want you to learn how to test your blood sugar level at home. Then you can see how illness, stress, or different kinds of food or medicine raise or lower your blood sugar level. Other tests may be needed to see if you have diabetes. How can you prevent high blood sugar? · Watch your weight. If you're overweight, losing just a small amount of weight may help. Reducing fat around your waist is most important. · Limit the amount of calories, sweets, and unhealthy fat you eat. Ask your doctor if a dietitian can help you. A registered dietitian can help you create meal plans that fit your lifestyle. · Get at least 30 minutes of exercise on most days of the week. Exercise helps control your blood sugar. It also helps you maintain a healthy weight. Walking is a good choice.  You also may want to do other activities, such as running, swimming, cycling, or playing tennis or team sports. · If your doctor prescribed medicines, take them exactly as prescribed. Call your doctor if you think you are having a problem with your medicine. You will get more details on the specific medicines your doctor prescribes. Follow-up care is a key part of your treatment and safety. Be sure to make and go to all appointments, and call your doctor if you are having problems. It's also a good idea to know your test results and keep a list of the medicines you take. Where can you learn more? Go to http://horacio-machelle.info/. Enter O108 in the search box to learn more about \"Learning About High Blood Sugar. \"  Current as of: December 7, 2017  Content Version: 11.8  © 8381-6098 Healthwise, Incorporated. Care instructions adapted under license by [employer] (which disclaims liability or warranty for this information). If you have questions about a medical condition or this instruction, always ask your healthcare professional. [address] any warranty or liability for your use of this information.

## 2019-01-07 NOTE — ED PROVIDER NOTES
EMERGENCY DEPARTMENT HISTORY AND PHYSICAL EXAM  
     
 
Date: 1/6/2019 Patient Name: Ashli Bender History of Presenting Illness Chief Complaint Patient presents with  
 High Blood Sugar  
  reports he was not feeling well today and when he checked his sugar it ws 349 History Provided By: Patient HPI: Ashli Bender is a 58 y.o. male, pmhx DM / HTN, who presents ambulatory to the ED c/o gradual onset hyperglycemia PTA this evening. Pt states his BGL was 349 at home this evening. He reports generalized malaise and gate difficulties that he noted prior to checking his blood sugar. Pt notes a hx of similar sxs with his previous hyperglycemic episodes. He reports taking his regular dose of Metformin prior to coming to the ED. Pt denies any recent follow up with his PCP. He denies any recent changes in his medications or dosages. Pt otherwise specifically denies any recent fevers, chills, nausea, vomiting, diarrhea, abd pain, CP, SOB, urinary sxs, changes in BM, or headache. PCP: Haim Cheung MD 
 
Allergies: NKDA PMHx: Significant for DM, HTN, edema Social Hx: +tobacco (0.25 ppd), +EtOH (occasionally), -Illicit Drugs There are no other complaints, changes, or physical findings at this time. Current Outpatient Medications Medication Sig Dispense Refill  atorvastatin (LIPITOR) 80 mg tablet Take 1 Tab by mouth nightly. 30 Tab 11  
 lisinopril (PRINIVIL, ZESTRIL) 10 mg tablet TAKE 1 TABLET BY MOUTH EVERY DAY 30 Tab 11  
 naproxen (NAPROSYN) 500 mg tablet Take 1 Tab by mouth two (2) times daily (with meals). 60 Tab 1  
 insulin glargine-lixisenatide (SOLIQUA 100/33) 100 unit-33 mcg/mL inpn INJECT 15 UNITS DAILY 5 Pen 11  
 Insulin Needles, Disposable, 31 gauge x 5/16\" ndle Use to inject insulin once daily. 100 Pen Needle 11  
 metFORMIN (GLUCOPHAGE) 500 mg tablet TAKE 2 TABLETS BY MOUTH TWICE DAILY 120 Tab 12  glipiZIDE (GLUCOTROL) 10 mg tablet Take 1 Tab by mouth two (2) times a day. 60 Tab 12  
 pioglitazone (ACTOS) 45 mg tablet Take 1 Tab by mouth daily. 30 Tab 12  Blood-Glucose Meter (ONETOUCH ULTRA2) monitoring kit Use to test blood sugar once a day 1 Kit 0  
 glucose blood VI test strips (ONETOUCH ULTRA TEST) strip Use to test blood sugar once daily 50 Strip 11  Lancets misc Use to test blood sugar once daily 100 Each 11  
 sildenafil citrate (VIAGRA) 100 mg tablet Take 1 Tab by mouth as needed. 4 Tab 12  Blood-Glucose Meter monitoring kit Use twice a day 1 Kit 0 Past History Past Medical History: 
Past Medical History:  
Diagnosis Date  Callous ulcer ([address] 4/13/2011  Edema 4/13/2011  Hypertension 4/13/2011 Past Surgical History: No past surgical history on file. Family History: 
Family History Problem Relation Age of Onset  Diabetes Mother Social History: 
Social History Tobacco Use  Smoking status: Current Every Day Smoker Packs/day: 0.25 Years: 45.00 Pack years: 11.25  Smokeless tobacco: Never Used Substance Use Topics  Alcohol use: Yes  Drug use: No  
 
 
Allergies: 
No Known Allergies Review of Systems Review of Systems Constitutional: Negative for chills and fever.  
     +generalized malaise HENT: Negative. Eyes: Negative. Respiratory: Negative for cough, chest tightness and shortness of breath. Cardiovascular: Negative for chest pain and leg swelling. Gastrointestinal: Negative for abdominal pain, diarrhea, nausea and vomiting. Endocrine: Negative. Genitourinary: Negative for difficulty urinating and dysuria. Musculoskeletal: Positive for gait problem. Negative for myalgias. Skin: Negative. Psychiatric/Behavioral: Negative. All other systems reviewed and are negative. Physical Exam  
Physical Exam  
Constitutional: He is oriented to person, place, and time.  He appears well-developed and well-nourished. No distress. HENT:  
Head: Normocephalic and atraumatic. Nose: Nose normal.  
Mouth/Throat: No oropharyngeal exudate. Eyes: Conjunctivae and EOM are normal. Pupils are equal, round, and reactive to light. Neck: Normal range of motion. Neck supple. No JVD present. Cardiovascular: Normal rate, regular rhythm, normal heart sounds and intact distal pulses. Exam reveals no friction rub. No murmur heard. Pulmonary/Chest: Effort normal and breath sounds normal. No stridor. No respiratory distress. He has no wheezes. He has no rales. Abdominal: Soft. Bowel sounds are normal. He exhibits no distension. There is no tenderness. There is no rebound. Musculoskeletal: Normal range of motion. He exhibits no tenderness. Neurological: He is alert and oriented to person, place, and time. No cranial nerve deficit. He exhibits normal muscle tone. Coordination normal.  
Skin: Skin is warm and dry. No rash noted. He is not diaphoretic. Psychiatric: He has a normal mood and affect. His speech is normal and behavior is normal. Judgment and thought content normal. Cognition and memory are normal.  
Nursing note and vitals reviewed. Diagnostic Study Results Labs - Recent Results (from the past 12 hour(s)) GLUCOSE, POC Collection Time: 01/06/19 10:46 PM  
Result Value Ref Range Glucose (POC) 316 (H) 65 - 100 mg/dL Performed by Huber CBC WITH AUTOMATED DIFF Collection Time: 01/06/19 11:06 PM  
Result Value Ref Range WBC 6.6 4.1 - 11.1 K/uL  
 RBC 5.49 4. 10 - 5.70 M/uL  
 HGB 17.0 12.1 - 17.0 g/dL HCT 48.2 36.6 - 50.3 % MCV 87.8 80.0 - 99.0 FL  
 MCH 31.0 26.0 - 34.0 PG  
 MCHC 35.3 30.0 - 36.5 g/dL  
 RDW 13.2 11.5 - 14.5 % PLATELET 418 425 - 728 K/uL MPV 10.6 8.9 - 12.9 FL  
 NRBC 0.0 0  WBC ABSOLUTE NRBC 0.00 0.00 - 0.01 K/uL NEUTROPHILS 52 32 - 75 % LYMPHOCYTES 38 12 - 49 % MONOCYTES 6 5 - 13 % EOSINOPHILS 2 0 - 7 % BASOPHILS 1 0 - 1 % IMMATURE GRANULOCYTES 0 0.0 - 0.5 % ABS. NEUTROPHILS 3.4 1.8 - 8.0 K/UL  
 ABS. LYMPHOCYTES 2.5 0.8 - 3.5 K/UL  
 ABS. MONOCYTES 0.4 0.0 - 1.0 K/UL  
 ABS. EOSINOPHILS 0.1 0.0 - 0.4 K/UL  
 ABS. BASOPHILS 0.1 0.0 - 0.1 K/UL  
 ABS. IMM. GRANS. 0.0 0.00 - 0.04 K/UL  
 DF AUTOMATED METABOLIC PANEL, COMPREHENSIVE Collection Time: 01/06/19 11:06 PM  
Result Value Ref Range Sodium 136 136 - 145 mmol/L Potassium 4.4 3.5 - 5.1 mmol/L Chloride 102 97 - 108 mmol/L  
 CO2 28 21 - 32 mmol/L Anion gap 6 5 - 15 mmol/L Glucose 330 (H) 65 - 100 mg/dL BUN 11 6 - 20 MG/DL Creatinine 1.09 0.70 - 1.30 MG/DL  
 BUN/Creatinine ratio 10 (L) 12 - 20 GFR est AA >60 >60 ml/min/1.73m2 GFR est non-AA >60 >60 ml/min/1.73m2 Calcium 9.5 8.5 - 10.1 MG/DL Bilirubin, total 0.6 0.2 - 1.0 MG/DL  
 ALT (SGPT) 25 12 - 78 U/L  
 AST (SGOT) 23 15 - 37 U/L Alk. phosphatase 154 (H) 45 - 117 U/L Protein, total 7.6 6.4 - 8.2 g/dL Albumin 3.3 (L) 3.5 - 5.0 g/dL Globulin 4.3 (H) 2.0 - 4.0 g/dL A-G Ratio 0.8 (L) 1.1 - 2.2 GLUCOSE, POC Collection Time: 01/07/19 12:37 AM  
Result Value Ref Range Glucose (POC) 268 (H) 65 - 100 mg/dL Performed by Tanya Chen (CYNTHIAT) Radiologic Studies - No orders to display CT Results  (Last 48 hours) None CXR Results  (Last 48 hours) None Medical Decision Making I am the first provider for this patient. I reviewed the vital signs, available nursing notes, past medical history, past surgical history, family history and social history. Vital Signs-Reviewed the patient's vital signs. Patient Vitals for the past 12 hrs: 
 Temp Pulse Resp BP SpO2  
01/06/19 2242 98.1 °F (36.7 °C) 68 16 165/88 97 % Records Reviewed: Nursing Notes and Old Medical Records Provider Notes (Medical Decision Making): DDX: 
Hyperglycemia, uti, dehydration, dka Plan: Labs, ivf Impression: 
hyperglycemis ED Course:  
Initial assessment performed. The patients presenting problems have been discussed, and they are in agreement with the care plan formulated and outlined with them. I have encouraged them to ask questions as they arise throughout their visit. I reviewed our electronic medical record system for any past medical records that were available that may contribute to the patients current condition, the nursing notes and and vital signs from today's visit Nursing notes will be reviewed as they become available in realtime while the pt has been in the ED. Soha Harper MD 
 
I have spent 3-5 minutes discussing the medical risks of prolonged smoking habits and advised the patient of the benefits of the cessation of smoking, providing specific suggestions on how to quit. Soha Harper MD 
 
PROGRESS NOTE: 
12:11 AM 
Pt resting comfortably at this time. Written by Ashly Cervantes ED Scribe, as dictated by Soha Harper MD 
 
12:43 AM 
Progress note: 
Pt noted to be feeling better, repeat , ready for discharge. Discussed lab findings with pt and/or family, specifically noting BGL trending down. Pt will follow up as instructed with his PCP or endocrinologist. All questions have been answered, pt voiced understanding and agreement with plan. If narcotics were prescribed, pt was advised not to drive or operate heavy machinery. If abx were prescribed, pt advised that diarrhea and rash are possible side effects of the medications. Specific return precautions provided in addition to instructions for pt to return to the ED immediately should sx worsen at any time. Soha Harper MD 
 
 
Critical Care Time:  
 
none Diagnosis Clinical Impression: 1. Hyperglycemia PLAN: 
1. Current Discharge Medication List  
  
 
2. Follow-up Information Follow up With Specialties Details Why Contact Info Gemma Peabody, MD Internal Medicine Schedule an appointment as soon as possible for a visit in 2 days to discuss your diabetes medication [id number] [address] 
[phone] Rosanne Ashley MD Endocrinology Schedule an appointment as soon as possible for a visit in 2 days  [address] [facility] [address] [facility]. 
[phone] Return to ED if worse Disposition: 
 
DISCHARGE NOTE: 
12:43 AM 
The patient's results have been reviewed with family and/or caregiver. They verbally convey their understanding and agreement of the patient's signs, symptoms, diagnosis, treatment, and prognosis. They additionally agree to follow up as recommended in the discharge instructions or to return to the Emergency Room should the patient's condition change prior to their follow-up appointment. The family and/or caregiver verbally agrees with the care-plan and all of their questions have been answered. The discharge instructions have also been provided to the them along with educational information regarding the patient's diagnosis and a list of reasons why the patient would want to return to the ER prior to their follow-up appointment should their condition change. Written by CYNTHIA Ledesmaibpaolo, as dictated by Eneida Sen MD . Attestations: This note is prepared by Patrizia Freeman, acting as MD Eneida Milligan MD : The scribe's documentation has been prepared under my direction and personally reviewed by me in its entirety. I confirm that the note above accurately reflects all work, treatment, procedures, and medical decision making performed by me. This note will not be viewable in [address]

## 2019-01-07 NOTE — PROGRESS NOTES
Dr. Bj Freed reviewed discharge instructions with the patient. The patient verbalized understanding. All questions and concerns were addressed. The patient declined a wheelchair and is discharged ambulatory in the care of family members with instructions and prescriptions in hand. Pt is alert and oriented x 4. Respirations are clear and unlabored. 4

## 2019-01-08 ENCOUNTER — OFFICE VISIT (OUTPATIENT)
Dept: INTERNAL MEDICINE CLINIC | Age: 64
End: 2019-01-08

## 2019-01-08 VITALS
OXYGEN SATURATION: 92 % | TEMPERATURE: 98.3 F | HEART RATE: 60 BPM | DIASTOLIC BLOOD PRESSURE: 76 MMHG | RESPIRATION RATE: 14 BRPM | SYSTOLIC BLOOD PRESSURE: 130 MMHG | WEIGHT: 177.7 LBS | BODY MASS INDEX: 26.32 KG/M2 | HEIGHT: 69 IN

## 2019-01-08 DIAGNOSIS — E11.9 CONTROLLED TYPE 2 DIABETES MELLITUS WITHOUT COMPLICATION, WITHOUT LONG-TERM CURRENT USE OF INSULIN (HCC): ICD-10-CM

## 2019-01-08 DIAGNOSIS — I10 ESSENTIAL HYPERTENSION: ICD-10-CM

## 2019-01-08 DIAGNOSIS — E11.65 UNCONTROLLED TYPE 2 DIABETES MELLITUS WITH HYPERGLYCEMIA (HCC): Primary | ICD-10-CM

## 2019-01-08 DIAGNOSIS — J44.9 CHRONIC OBSTRUCTIVE PULMONARY DISEASE, UNSPECIFIED COPD TYPE (HCC): ICD-10-CM

## 2019-01-08 DIAGNOSIS — Z00.00 PHYSICAL EXAM: ICD-10-CM

## 2019-01-08 DIAGNOSIS — N40.0 BENIGN PROSTATIC HYPERPLASIA WITHOUT LOWER URINARY TRACT SYMPTOMS: ICD-10-CM

## 2019-01-08 DIAGNOSIS — E78.5 DYSLIPIDEMIA: ICD-10-CM

## 2019-01-08 RX ORDER — GLIPIZIDE 10 MG/1
TABLET ORAL
Qty: 60 TAB | Refills: 0 | Status: SHIPPED | OUTPATIENT
Start: 2019-01-08 | End: 2019-01-08 | Stop reason: SDUPTHER

## 2019-01-08 NOTE — LETTER
NOTIFICATION RETURN TO WORK / SCHOOL 
 
1/8/2019 12:21 PM 
 
Mr. Oscar Cross [address] [id number] To Whom It May Concern: 
 
Oscar Cross is currently under the care of [address] [place] [address].01/08/2019 He will return to work/school on:01/09/2019 If there are questions or concerns please have the patient contact our office. Sincerely, Ryan Luis MD

## 2019-01-09 LAB
ALBUMIN/CREAT UR: 88 MG/G CREAT (ref 0–30)
CHOLEST SERPL-MCNC: 159 MG/DL (ref 100–199)
CREAT UR-MCNC: 279.1 MG/DL
EST. AVERAGE GLUCOSE BLD GHB EST-MCNC: 367 MG/DL
HBA1C MFR BLD: 14.4 % (ref 4.8–5.6)
HDLC SERPL-MCNC: 37 MG/DL
LDLC SERPL CALC-MCNC: 98 MG/DL (ref 0–99)
MICROALBUMIN UR-MCNC: 245.7 UG/ML
PSA SERPL-MCNC: 1.4 NG/ML (ref 0–4)
TRIGL SERPL-MCNC: 119 MG/DL (ref 0–149)
VLDLC SERPL CALC-MCNC: 24 MG/DL (ref 5–40)

## 2019-01-09 RX ORDER — GLIPIZIDE 10 MG/1
10 TABLET ORAL 2 TIMES DAILY
Qty: 60 TAB | Refills: 11 | Status: SHIPPED | OUTPATIENT
Start: 2019-01-09 | End: 2020-02-05 | Stop reason: SDUPTHER

## 2019-01-13 PROBLEM — E11.65 UNCONTROLLED TYPE 2 DIABETES MELLITUS WITH HYPERGLYCEMIA (HCC): Status: ACTIVE | Noted: 2019-01-13

## 2019-01-13 NOTE — PROGRESS NOTES
[address] [place] [address] and Primary Care 
[place] [address] 
[address] [place] [address] [address] Phone:  [phone]  Fax: [phone] Chief Complaint Patient presents with  ED Follow-up Patient seen [facility] on 1/6/19 for elevated blood sugar. .   
 
SUBJECTIVE: 
  Barrie Kumar is a 58 y.o. male Comes in after a long absence. He has not been taking any insulin. He is a diabetic and was formerly on insulin. He has had polyuria, polydipsia, ending up in the ER on January 6th. He comes in for follow up. He has a past history of primary hypertension and dyslipidemia. Current Outpatient Medications Medication Sig Dispense Refill  glipiZIDE (GLUCOTROL) 10 mg tablet Take 1 Tab by mouth two (2) times a day. 60 Tab 11  
 atorvastatin (LIPITOR) 80 mg tablet Take 1 Tab by mouth nightly. 30 Tab 11  
 lisinopril (PRINIVIL, ZESTRIL) 10 mg tablet TAKE 1 TABLET BY MOUTH EVERY DAY 30 Tab 11  
 naproxen (NAPROSYN) 500 mg tablet Take 1 Tab by mouth two (2) times daily (with meals). 60 Tab 1  
 insulin glargine-lixisenatide (SOLIQUA 100/33) 100 unit-33 mcg/mL inpn INJECT 15 UNITS DAILY 5 Pen 11  
 Insulin Needles, Disposable, 31 gauge x 5/16\" ndle Use to inject insulin once daily. 100 Pen Needle 11  
 metFORMIN (GLUCOPHAGE) 500 mg tablet TAKE 2 TABLETS BY MOUTH TWICE DAILY 120 Tab 12  
 pioglitazone (ACTOS) 45 mg tablet Take 1 Tab by mouth daily. 30 Tab 12  Blood-Glucose Meter (ONETOUCH ULTRA2) monitoring kit Use to test blood sugar once a day 1 Kit 0  
 glucose blood VI test strips (ONETOUCH ULTRA TEST) strip Use to test blood sugar once daily 50 Strip 11  Lancets misc Use to test blood sugar once daily 100 Each 11  
 sildenafil citrate (VIAGRA) 100 mg tablet Take 1 Tab by mouth as needed. 4 Tab 12  Blood-Glucose Meter monitoring kit Use twice a day 1 Kit 0 Past Medical History:  
Diagnosis Date  Callous ulcer ([address] 4/13/2011  Edema 4/13/2011  Hypertension 4/13/2011 No past surgical history on file. No Known Allergies REVIEW OF SYSTEMS: 
General: negative for - chills or fever ENT: negative for - headaches, nasal congestion or tinnitus Respiratory: negative for - cough, hemoptysis, shortness of breath or wheezing Cardiovascular : negative for - chest pain, edema, palpitations or shortness of breath Gastrointestinal: negative for - abdominal pain, blood in stools, heartburn or nausea/vomiting Genito-Urinary: no dysuria, trouble voiding, or hematuria Musculoskeletal: negative for - gait disturbance, joint pain, joint stiffness or joint swelling Neurological: no TIA or stroke symptoms Hematologic: no bruises, no bleeding, no swollen glands Integument: no lumps, mole changes, nail changes or rash Endocrine: no malaise/lethargy or unexpected weight changes Social History Socioeconomic History  Marital status:  Spouse name: Not on file  Number of children: Not on file  Years of education: Not on file  Highest education level: Not on file Tobacco Use  Smoking status: Current Every Day Smoker Packs/day: 0.25 Years: 45.00 Pack years: 11.25  Smokeless tobacco: Never Used Substance and Sexual Activity  Alcohol use: Yes  Drug use: No  
 Sexual activity: Yes  
  Partners: Female Family History Problem Relation Age of Onset  Diabetes Mother OBJECTIVE: 
 
Visit Vitals /76 Pulse 60 Temp 98.3 °F (36.8 °C) (Oral) Resp 14 Ht 5' 9\" (1.753 m) Wt 177 lb 11.2 oz (80.6 kg) SpO2 92% BMI 26.24 kg/m² CONSTITUTIONAL: well , well nourished, appears age appropriate EYES: perrla, eom intact ENMT:moist mucous membranes, pharynx clear NECK: supple. Thyroid normal 
RESPIRATORY: Chest: clear to ascultation and percussion CARDIOVASCULAR: Heart: regular rate and rhythm GASTROINTESTINAL: Abdomen: soft, bowel sounds active HEMATOLOGIC: no pathological lymph nodes palpated MUSCULOSKELETAL: Extremities: no edema, pulse 1+ INTEGUMENT: No unusual rashes or suspicious skin lesions noted. Nails appear normal. 
NEUROLOGIC: non-focal exam  
MENTAL STATUS: alert and oriented, appropriate affect ASSESSMENT: 
1. Uncontrolled type 2 diabetes mellitus with hyperglycemia ([address] 2. Essential hypertension 3. Dyslipidemia 4. Chronic obstructive pulmonary disease, unspecified COPD type ([address] 5. Benign prostatic hyperplasia without lower urinary tract symptoms 6. Physical exam   
 
 
PLAN: 
 
1. Patient's diabetes is not well controlled of course. I will get a hemoglobin A1c to determine the intensity of poor control and he will be started on premixed insulin from [facility], which is the cheapest.  Cost is of most importance currently. 2. His blood pressure appears to be acceptable for now. 3. He needs to be on a statin in view of his significantly increased cardiovascular risk. 4. He continues to smoke cigarettes and I encouraged him to discontinue this. 5. I encouraged him to keep his appointments. . 
Orders Placed This Encounter  LIPID PANEL  
 HEMOGLOBIN A1C  
 MICROALBUMIN, UR, RAND  PROSTATE SPECIFIC AG Follow-up Disposition: 
Return in about 4 weeks (around 2/5/2019).  
 
 
He Sung MD

## 2019-01-14 RX ORDER — ATORVASTATIN CALCIUM 80 MG/1
80 TABLET, FILM COATED ORAL DAILY
Qty: 30 TAB | Refills: 11 | Status: SHIPPED | OUTPATIENT
Start: 2019-01-14 | End: 2019-01-21 | Stop reason: SDUPTHER

## 2019-01-14 RX ORDER — PEN NEEDLE, DIABETIC 30 GX3/16"
NEEDLE, DISPOSABLE MISCELLANEOUS
Qty: 100 PEN NEEDLE | Refills: 11 | Status: SHIPPED | OUTPATIENT
Start: 2019-01-14 | End: 2019-06-21 | Stop reason: SDUPTHER

## 2019-01-14 RX ORDER — METFORMIN HYDROCHLORIDE 500 MG/1
TABLET, EXTENDED RELEASE ORAL
Qty: 120 TAB | Refills: 11 | Status: SHIPPED | OUTPATIENT
Start: 2019-01-14 | End: 2019-01-21 | Stop reason: SDUPTHER

## 2019-01-21 DIAGNOSIS — E11.65 UNCONTROLLED TYPE 2 DIABETES MELLITUS WITH HYPERGLYCEMIA (HCC): ICD-10-CM

## 2019-01-21 DIAGNOSIS — E78.5 DYSLIPIDEMIA: ICD-10-CM

## 2019-01-21 RX ORDER — ATORVASTATIN CALCIUM 80 MG/1
80 TABLET, FILM COATED ORAL DAILY
Qty: 30 TAB | Refills: 11 | Status: SHIPPED | OUTPATIENT
Start: 2019-01-21 | End: 2020-02-14 | Stop reason: SDUPTHER

## 2019-01-21 RX ORDER — METFORMIN HYDROCHLORIDE 500 MG/1
TABLET, EXTENDED RELEASE ORAL
Qty: 120 TAB | Refills: 11 | Status: SHIPPED | OUTPATIENT
Start: 2019-01-21 | End: 2020-01-28 | Stop reason: SDUPTHER

## 2019-02-12 ENCOUNTER — OFFICE VISIT (OUTPATIENT)
Dept: INTERNAL MEDICINE CLINIC | Age: 64
End: 2019-02-12

## 2019-02-12 VITALS
OXYGEN SATURATION: 91 % | DIASTOLIC BLOOD PRESSURE: 67 MMHG | HEIGHT: 69 IN | SYSTOLIC BLOOD PRESSURE: 121 MMHG | BODY MASS INDEX: 26.13 KG/M2 | RESPIRATION RATE: 16 BRPM | WEIGHT: 176.4 LBS | TEMPERATURE: 98.5 F | HEART RATE: 68 BPM

## 2019-02-12 DIAGNOSIS — I10 ESSENTIAL HYPERTENSION: ICD-10-CM

## 2019-02-12 DIAGNOSIS — J44.9 CHRONIC OBSTRUCTIVE PULMONARY DISEASE, UNSPECIFIED COPD TYPE (HCC): ICD-10-CM

## 2019-02-12 DIAGNOSIS — E11.65 UNCONTROLLED TYPE 2 DIABETES MELLITUS WITH HYPERGLYCEMIA (HCC): Primary | ICD-10-CM

## 2019-02-12 DIAGNOSIS — L84 PRE-ULCERATIVE CALLUSES: ICD-10-CM

## 2019-02-12 DIAGNOSIS — H91.93 DECREASED HEARING OF BOTH EARS: ICD-10-CM

## 2019-02-12 DIAGNOSIS — E78.5 DYSLIPIDEMIA: ICD-10-CM

## 2019-02-12 NOTE — PROGRESS NOTES
[address] [place] [address] and Primary Care 
[place] [address] 
[address] [place] [address] [address] Phone:  [phone]  Fax: [phone] Chief Complaint Patient presents with  Diabetes 1 month follow up Darek Gibbons SUBJECTIVE: 
  Judson Dunaway is a 58 y.o. male Comes in for return visit stating that he is doing well. He is now taking his insulin 30 units daily of the 70/30 Relion. This is a reasonable cost. He is not having any interventional hypoglycemia and blood sugars have been excellent. I again remind him of the importance of eating at the same time every day. He also continues to smoke cigarettes. He has existing COPD. He has a past history of primary hypertension and dyslipidemia. He has multiple calluses on the dorsal aspect of his right foot and wishes to see a podiatrist. 
 
According to his wife, his hearing is reduced and she would like for him to see an ENT physician. Current Outpatient Medications Medication Sig Dispense Refill  atorvastatin (LIPITOR) 80 mg tablet Take 1 Tab by mouth daily. 30 Tab 11  
 metFORMIN ER (GLUCOPHAGE XR) 500 mg tablet 2 tablets twice a day with breakfast and dinner 120 Tab 11  
 insulin NPH/insulin regular (NOVOLIN 70/30, HUMULIN 70/30) 100 unit/mL (70-30) injection Take 30 units ac breakfast 1 Vial 11  
 Insulin Needles, Disposable, 31 gauge x 5/16\" ndle Use to inject insulin once daily. 100 Pen Needle 11  
 glipiZIDE (GLUCOTROL) 10 mg tablet Take 1 Tab by mouth two (2) times a day. 60 Tab 11  
 lisinopril (PRINIVIL, ZESTRIL) 10 mg tablet TAKE 1 TABLET BY MOUTH EVERY DAY 30 Tab 11  
 naproxen (NAPROSYN) 500 mg tablet Take 1 Tab by mouth two (2) times daily (with meals). 60 Tab 1  pioglitazone (ACTOS) 45 mg tablet Take 1 Tab by mouth daily. 30 Tab 12  Blood-Glucose Meter (ONETOUCH ULTRA2) monitoring kit Use to test blood sugar once a day 1 Kit 0  
 glucose blood VI test strips (ONETOUCH ULTRA TEST) strip Use to test blood sugar once daily 50 Strip 11  Lancets misc Use to test blood sugar once daily 100 Each 11  
 sildenafil citrate (VIAGRA) 100 mg tablet Take 1 Tab by mouth as needed. 4 Tab 12  Blood-Glucose Meter monitoring kit Use twice a day 1 Kit 0 Past Medical History:  
Diagnosis Date  Callous ulcer ([address] 4/13/2011  Edema 4/13/2011  Hypertension 4/13/2011 History reviewed. No pertinent surgical history. No Known Allergies REVIEW OF SYSTEMS: 
General: negative for - chills or fever ENT: negative for - headaches, nasal congestion or tinnitus Respiratory: negative for - cough, hemoptysis, shortness of breath or wheezing Cardiovascular : negative for - chest pain, edema, palpitations or shortness of breath Gastrointestinal: negative for - abdominal pain, blood in stools, heartburn or nausea/vomiting Genito-Urinary: no dysuria, trouble voiding, or hematuria Musculoskeletal: negative for - gait disturbance, joint pain, joint stiffness or joint swelling Neurological: no TIA or stroke symptoms Hematologic: no bruises, no bleeding, no swollen glands Integument: no lumps, mole changes, nail changes or rash Endocrine: no malaise/lethargy or unexpected weight changes Social History Socioeconomic History  Marital status:  Spouse name: Not on file  Number of children: Not on file  Years of education: Not on file  Highest education level: Not on file Tobacco Use  Smoking status: Current Every Day Smoker Packs/day: 0.25 Years: 45.00 Pack years: 11.25  Smokeless tobacco: Never Used Substance and Sexual Activity  Alcohol use: Yes  Drug use: No  
 Sexual activity: Yes  
  Partners: Female Family History Problem Relation Age of Onset  Diabetes Mother OBJECTIVE: 
 
Visit Vitals /67 Pulse 68 Temp 98.5 °F (36.9 °C) (Oral) Resp 16 Ht 5' 9\" (1.753 m) Wt 176 lb 6.4 oz (80 kg) SpO2 91% BMI 26.05 kg/m² CONSTITUTIONAL: well , well nourished, appears age appropriate EYES: perrla, eom intact ENMT:moist mucous membranes, pharynx clear NECK: supple. Thyroid normal 
RESPIRATORY: Chest: clear to ascultation and percussion CARDIOVASCULAR: Heart: regular rate and rhythm GASTROINTESTINAL: Abdomen: soft, bowel sounds active HEMATOLOGIC: no pathological lymph nodes palpated MUSCULOSKELETAL: Extremities: no edema, pulse 1+ INTEGUMENT: No unusual rashes or suspicious skin lesions noted. Nails appear normal. 
NEUROLOGIC: non-focal exam  
MENTAL STATUS: alert and oriented, appropriate affect ASSESSMENT: 
1. Uncontrolled type 2 diabetes mellitus with hyperglycemia ([address] 2. Essential hypertension 3. Dyslipidemia 4. Chronic obstructive pulmonary disease, unspecified COPD type ([address] 5. Decreased hearing of both ears 6. Pre-ulcerative calluses PLAN: 
 
1. The patient hopefully is doing well. I will await the results of his blood sugar before discharge. I remind him of the importance of eating at the same time every day, including his hs snack. This will minimize hypoglycemia and glycemic variability. 2. Blood pressure is excellent, no adjustments will be made. 3. He will continue statin in view of his increased cardiovascular risk. 4. He has significant COPD and needs to discontinue his cigarette smoking. His saturation is only 91%. 5. Appointments will be made for him to see ophthalmologist, podiatrist and ENT. Follow-up Disposition: 
Return in about 3 months (around 5/12/2019).  
 
 
Jeanmarie Hart MD

## 2019-02-12 NOTE — PROGRESS NOTES
Chief Complaint Patient presents with  Diabetes 1 month follow up 1. Have you been to the ER, urgent care clinic since your last visit? Hospitalized since your last visit? No 
 
2. Have you seen or consulted any other health care providers outside of the [address] [place] [address] since your last visit? Include any pap smears or colon screening.  No

## 2019-02-16 LAB
BUN SERPL-MCNC: 11 MG/DL (ref 8–27)
BUN/CREAT SERPL: 13 (ref 10–24)
CALCIUM SERPL-MCNC: 9.7 MG/DL (ref 8.6–10.2)
CHLORIDE SERPL-SCNC: 100 MMOL/L (ref 96–106)
CO2 SERPL-SCNC: 26 MMOL/L (ref 20–29)
CREAT SERPL-MCNC: 0.84 MG/DL (ref 0.76–1.27)
FRUCTOSAMINE SERPL-SCNC: 243 UMOL/L (ref 0–285)
GLUCOSE SERPL-MCNC: 149 MG/DL (ref 65–99)
POTASSIUM SERPL-SCNC: 5 MMOL/L (ref 3.5–5.2)
SODIUM SERPL-SCNC: 140 MMOL/L (ref 134–144)

## 2019-03-12 DIAGNOSIS — E11.9 CONTROLLED TYPE 2 DIABETES MELLITUS WITHOUT COMPLICATION, WITHOUT LONG-TERM CURRENT USE OF INSULIN (HCC): ICD-10-CM

## 2019-03-12 RX ORDER — PIOGLITAZONEHYDROCHLORIDE 45 MG/1
45 TABLET ORAL DAILY
Qty: 30 TAB | Refills: 11 | Status: SHIPPED | OUTPATIENT
Start: 2019-03-12 | End: 2020-04-28 | Stop reason: SDUPTHER

## 2019-04-12 ENCOUNTER — OFFICE VISIT (OUTPATIENT)
Dept: INTERNAL MEDICINE CLINIC | Age: 64
End: 2019-04-12

## 2019-04-12 VITALS
RESPIRATION RATE: 16 BRPM | DIASTOLIC BLOOD PRESSURE: 74 MMHG | HEIGHT: 69 IN | HEART RATE: 63 BPM | WEIGHT: 181.9 LBS | BODY MASS INDEX: 26.94 KG/M2 | SYSTOLIC BLOOD PRESSURE: 116 MMHG | OXYGEN SATURATION: 91 % | TEMPERATURE: 98.3 F

## 2019-04-12 DIAGNOSIS — E11.22 CONTROLLED TYPE 2 DIABETES MELLITUS WITH CHRONIC KIDNEY DISEASE, UNSPECIFIED CKD STAGE, UNSPECIFIED WHETHER LONG TERM INSULIN USE (HCC): Primary | ICD-10-CM

## 2019-04-12 DIAGNOSIS — E66.3 OVERWEIGHT (BMI 25.0-29.9): ICD-10-CM

## 2019-04-12 DIAGNOSIS — I10 ESSENTIAL HYPERTENSION: ICD-10-CM

## 2019-04-12 DIAGNOSIS — E78.5 DYSLIPIDEMIA: ICD-10-CM

## 2019-04-12 DIAGNOSIS — J44.9 CHRONIC OBSTRUCTIVE PULMONARY DISEASE, UNSPECIFIED COPD TYPE (HCC): ICD-10-CM

## 2019-04-12 NOTE — PROGRESS NOTES
Chief Complaint Patient presents with  Diabetes 2 month follow up 1. Have you been to the ER, urgent care clinic since your last visit? Hospitalized since your last visit? No 
 
2. Have you seen or consulted any other health care providers outside of the [address] [place] [address] since your last visit? Include any pap smears or colon screening.  No

## 2019-04-12 NOTE — PROGRESS NOTES
[facility] and Primary Care 
[address] [place] [address] 
[address] [facility] [address] Phone:  [phone]  Fax: [phone] Chief Complaint Patient presents with  Diabetes 2 month follow up Isha Rascon SUBJECTIVE: 
  Parnell Riedel is a 58 y.o. male Comes in for return visit stating that his diabetes is doing a bit better. Unfortunately he is checking his sugar only once a day, which is a fasting. I explained to him last time and this time again the importance of checking sugar at least twice a day to get a better idea of how his blood sugars are indeed doing. He is currently taking his premixed insulin 30 units a.c. breakfast.  He has not had any interventional hypoglycemia. He states that his average fasting sugar is in the 120-130 range, which is good. Unfortunately he continues to smoke cigarettes. He does have existing COPD. He has a past history of primary hypertension and dyslipidemia. He works on a regular basis in and outside at [employer]. Current Outpatient Medications Medication Sig Dispense Refill  pioglitazone (ACTOS) 45 mg tablet Take 1 Tab by mouth daily. 30 Tab 11  
 atorvastatin (LIPITOR) 80 mg tablet Take 1 Tab by mouth daily. 30 Tab 11  
 metFORMIN ER (GLUCOPHAGE XR) 500 mg tablet 2 tablets twice a day with breakfast and dinner 120 Tab 11  
 insulin NPH/insulin regular (NOVOLIN 70/30, HUMULIN 70/30) 100 unit/mL (70-30) injection Take 30 units ac breakfast 1 Vial 11  
 Insulin Needles, Disposable, 31 gauge x 5/16\" ndle Use to inject insulin once daily. 100 Pen Needle 11  
 glipiZIDE (GLUCOTROL) 10 mg tablet Take 1 Tab by mouth two (2) times a day. 60 Tab 11  
 lisinopril (PRINIVIL, ZESTRIL) 10 mg tablet TAKE 1 TABLET BY MOUTH EVERY DAY 30 Tab 11  
 naproxen (NAPROSYN) 500 mg tablet Take 1 Tab by mouth two (2) times daily (with meals). 60 Tab 1  Blood-Glucose Meter (ONETOUCH ULTRA2) monitoring kit Use to test blood sugar once a day 1 Kit 0  
  glucose blood VI test strips (ONETOUCH ULTRA TEST) strip Use to test blood sugar once daily 50 Strip 11  Lancets misc Use to test blood sugar once daily 100 Each 11  
 sildenafil citrate (VIAGRA) 100 mg tablet Take 1 Tab by mouth as needed. 4 Tab 12  Blood-Glucose Meter monitoring kit Use twice a day 1 Kit 0 Past Medical History:  
Diagnosis Date  Callous ulcer ([address] 4/13/2011  Edema 4/13/2011  Hypertension 4/13/2011 History reviewed. No pertinent surgical history. No Known Allergies REVIEW OF SYSTEMS: 
General: negative for - chills or fever ENT: negative for - headaches, nasal congestion or tinnitus Respiratory: negative for - cough, hemoptysis, shortness of breath or wheezing Cardiovascular : negative for - chest pain, edema, palpitations or shortness of breath Gastrointestinal: negative for - abdominal pain, blood in stools, heartburn or nausea/vomiting Genito-Urinary: no dysuria, trouble voiding, or hematuria Musculoskeletal: negative for - gait disturbance, joint pain, joint stiffness or joint swelling Neurological: no TIA or stroke symptoms Hematologic: no bruises, no bleeding, no swollen glands Integument: no lumps, mole changes, nail changes or rash Endocrine: no malaise/lethargy or unexpected weight changes Social History Socioeconomic History  Marital status:  Spouse name: Not on file  Number of children: Not on file  Years of education: Not on file  Highest education level: Not on file Tobacco Use  Smoking status: Current Every Day Smoker Packs/day: 0.25 Years: 45.00 Pack years: 11.25  Smokeless tobacco: Never Used Substance and Sexual Activity  Alcohol use: Yes  Drug use: No  
 Sexual activity: Yes  
  Partners: Female Family History Problem Relation Age of Onset  Diabetes Mother OBJECTIVE: 
 
Visit Vitals /74 Pulse 63 Temp 98.3 °F (36.8 °C) (Oral) Resp 16  
 Ht 5' 9\" (1.753 m) Wt 181 lb 14.4 oz (82.5 kg) SpO2 91% BMI 26.86 kg/m² CONSTITUTIONAL: well , well nourished, appears age appropriate EYES: perrla, eom intact ENMT:moist mucous membranes, pharynx clear NECK: supple. Thyroid normal 
RESPIRATORY: Chest: clear to ascultation and percussion CARDIOVASCULAR: Heart: regular rate and rhythm GASTROINTESTINAL: Abdomen: soft, bowel sounds active HEMATOLOGIC: no pathological lymph nodes palpated MUSCULOSKELETAL: Extremities: no edema, pulse 1+ INTEGUMENT: No unusual rashes or suspicious skin lesions noted. Nails appear normal. 
NEUROLOGIC: non-focal exam  
MENTAL STATUS: alert and oriented, appropriate affect ASSESSMENT: 
1. Controlled type 2 diabetes mellitus with chronic kidney disease, unspecified CKD stage, unspecified whether long term insulin use ([facility] 2. Essential hypertension 3. Dyslipidemia 4. Chronic obstructive pulmonary disease, unspecified COPD type ([facility] 5. Overweight (BMI 25.0-29. 9) PLAN: 
 
1. I explained to him the importance of checking blood sugars twice a day before breakfast and before dinner. He has to take all of his antidiabetic medications as prescribed. 2. I emphasized dietary control via eating meals, eliminating snacking, other than his h.s. snack, and avoiding the consumption of processed carbohydrates. 3. He is encouraged to discontinue cigarette smoking. He does have existing COPD, but obviously this is a proatherogenic endeavor. 4. Blood pressure is excellent, no adjustments are made. 5. He will continue statin as prescribed in view of his significant increased cardiovascular risk. . 
Orders Placed This Encounter  HEMOGLOBIN A1C WITH EAG Follow-up and Dispositions · Return in about 3 months (around 7/12/2019).  
  
 
 
 
Debo Diallo MD

## 2019-04-13 PROBLEM — E66.3 OVERWEIGHT (BMI 25.0-29.9): Status: ACTIVE | Noted: 2019-04-13

## 2019-04-13 LAB
EST. AVERAGE GLUCOSE BLD GHB EST-MCNC: 163 MG/DL
HBA1C MFR BLD: 7.3 % (ref 4.8–5.6)

## 2019-06-21 RX ORDER — PEN NEEDLE, DIABETIC 30 GX3/16"
NEEDLE, DISPOSABLE MISCELLANEOUS
Qty: 100 PEN NEEDLE | Refills: 11 | Status: SHIPPED | OUTPATIENT
Start: 2019-06-21 | End: 2020-09-02 | Stop reason: SDUPTHER

## 2019-07-29 ENCOUNTER — OFFICE VISIT (OUTPATIENT)
Dept: INTERNAL MEDICINE CLINIC | Age: 64
End: 2019-07-29

## 2019-07-29 VITALS
WEIGHT: 182.7 LBS | HEART RATE: 64 BPM | DIASTOLIC BLOOD PRESSURE: 74 MMHG | HEIGHT: 69 IN | SYSTOLIC BLOOD PRESSURE: 113 MMHG | TEMPERATURE: 97.7 F | OXYGEN SATURATION: 94 % | BODY MASS INDEX: 27.06 KG/M2 | RESPIRATION RATE: 18 BRPM

## 2019-07-29 DIAGNOSIS — E66.3 OVERWEIGHT (BMI 25.0-29.9): ICD-10-CM

## 2019-07-29 DIAGNOSIS — I10 ESSENTIAL HYPERTENSION: Primary | ICD-10-CM

## 2019-07-29 DIAGNOSIS — J44.9 CHRONIC OBSTRUCTIVE PULMONARY DISEASE, UNSPECIFIED COPD TYPE (HCC): ICD-10-CM

## 2019-07-29 DIAGNOSIS — E11.9 CONTROLLED TYPE 2 DIABETES MELLITUS WITHOUT COMPLICATION, WITHOUT LONG-TERM CURRENT USE OF INSULIN (HCC): ICD-10-CM

## 2019-07-29 DIAGNOSIS — E78.5 DYSLIPIDEMIA: ICD-10-CM

## 2019-07-29 NOTE — PROGRESS NOTES
[facility] and Primary Care  [place] [address]  [address] [place] [address]  Phone:  [phone]  Fax: [phone]       Chief Complaint   Patient presents with    Hypertension     f/u   . SUBJECTIVE:    Janessa Olivo is a 58 y.o. male Comes in for return visit stating that he has done well. He has missed several appointments. he has a history of diabetes and has indeed been taking his insulin 30 units daily along with his other antidiabetic medication. According to his wife, he is not consistently taking his Glipizide. He has not had any interventional hypoglycemia. Unfortunately, he continues to smoke cigarettes and does have existing COPD. He has a past history of primary hypertension and dyslipidemia above and beyond his existing COPD. Current Outpatient Medications   Medication Sig Dispense Refill    Insulin Needles, Disposable, 31 gauge x 5/16\" ndle Use to inject insulin once daily. 100 Pen Needle 11    pioglitazone (ACTOS) 45 mg tablet Take 1 Tab by mouth daily. 30 Tab 11    atorvastatin (LIPITOR) 80 mg tablet Take 1 Tab by mouth daily. 30 Tab 11    metFORMIN ER (GLUCOPHAGE XR) 500 mg tablet 2 tablets twice a day with breakfast and dinner 120 Tab 11    insulin NPH/insulin regular (NOVOLIN 70/30, HUMULIN 70/30) 100 unit/mL (70-30) injection Take 30 units ac breakfast 1 Vial 11    glipiZIDE (GLUCOTROL) 10 mg tablet Take 1 Tab by mouth two (2) times a day.  60 Tab 11    lisinopril (PRINIVIL, ZESTRIL) 10 mg tablet TAKE 1 TABLET BY MOUTH EVERY DAY 30 Tab 11    Blood-Glucose Meter (ONETOUCH ULTRA2) monitoring kit Use to test blood sugar once a day 1 Kit 0    glucose blood VI test strips (ONETOUCH ULTRA TEST) strip Use to test blood sugar once daily 50 Strip 11    Lancets misc Use to test blood sugar once daily 100 Each 11    Blood-Glucose Meter monitoring kit Use twice a day 1 Kit 0    naproxen (NAPROSYN) 500 mg tablet Take 1 Tab by mouth two (2) times daily (with meals). 60 Tab 1    sildenafil citrate (VIAGRA) 100 mg tablet Take 1 Tab by mouth as needed. 4 Tab 12     Past Medical History:   Diagnosis Date    Callous ulcer ([address] 4/13/2011    Edema 4/13/2011    Hypertension 4/13/2011     History reviewed. No pertinent surgical history.   No Known Allergies      REVIEW OF SYSTEMS:  General: negative for - chills or fever  ENT: negative for - headaches, nasal congestion or tinnitus  Respiratory: negative for - cough, hemoptysis, shortness of breath or wheezing  Cardiovascular : negative for - chest pain, edema, palpitations or shortness of breath  Gastrointestinal: negative for - abdominal pain, blood in stools, heartburn or nausea/vomiting  Genito-Urinary: no dysuria, trouble voiding, or hematuria  Musculoskeletal: negative for - gait disturbance, joint pain, joint stiffness or joint swelling  Neurological: no TIA or stroke symptoms  Hematologic: no bruises, no bleeding, no swollen glands  Integument: no lumps, mole changes, nail changes or rash  Endocrine: no malaise/lethargy or unexpected weight changes      Social History     Socioeconomic History    Marital status:      Spouse name: Not on file    Number of children: Not on file    Years of education: Not on file    Highest education level: Not on file   Tobacco Use    Smoking status: Current Every Day Smoker     Packs/day: 0.25     Years: 45.00     Pack years: 11.25    Smokeless tobacco: Never Used   Substance and Sexual Activity    Alcohol use: Yes     Comment: occasional    Drug use: No    Sexual activity: Yes     Partners: Female     Family History   Problem Relation Age of Onset    Diabetes Mother        OBJECTIVE:    Visit Vitals  /74   Pulse 64   Temp 97.7 °F (36.5 °C) (Oral)   Resp 18   Ht 5' 9\" (1.753 m)   Wt 182 lb 11.2 oz (82.9 kg)   SpO2 94%   BMI 26.98 kg/m²     CONSTITUTIONAL: well , well nourished, appears age appropriate  EYES: perrla, eom intact  ENMT:moist mucous membranes, pharynx clear  NECK: supple. Thyroid normal  RESPIRATORY: Chest: clear to ascultation and percussion   CARDIOVASCULAR: Heart: regular rate and rhythm  GASTROINTESTINAL: Abdomen: soft, bowel sounds active  HEMATOLOGIC: no pathological lymph nodes palpated  MUSCULOSKELETAL: Extremities: no edema, pulse 1+   INTEGUMENT: No unusual rashes or suspicious skin lesions noted. Nails appear normal.  NEUROLOGIC: non-focal exam   MENTAL STATUS: alert and oriented, appropriate affect      ASSESSMENT:  1. Essential hypertension    2. Controlled type 2 diabetes mellitus without complication, without long-term current use of insulin ([address]    3. Chronic obstructive pulmonary disease, unspecified COPD type ([address]    4. Dyslipidemia    5. Overweight (BMI 25.0-29. 9)        PLAN:    1. BP is acceptable today, no adjustments are made. 2. From a diabetic perspective I will await the results of his hemoglobin A1c. I remind him of the importance of eating at the same time every day and eliminating his consumption of processed carbohydrates. 3. He needs to discontinue cigarette smoking. He has existing COPD, which is reasonably stable today. 4. He will continue statin in view of his increased cardiovascular risk. I will await the results of his Apo-B.  5. He needs to minimize further weight gain. I emphasized eating meals, eliminating snacks and again avoiding sweets, white breads and white potatoes. .  Orders Placed This Encounter    HEMOGLOBIN A1C WITH EAG    METABOLIC PANEL, BASIC    APOLIPOPROTEIN B    MICROALBUMIN, UR, RAND W/ MICROALB/CREAT RATIO    REFERRAL TO OPHTHALMOLOGY         Follow-up and Dispositions    · Return in about 3 months (around 10/29/2019).            Yunior Barrios MD

## 2019-07-30 LAB
ALBUMIN/CREAT UR: 178.3 MG/G CREAT (ref 0–30)
APO B SERPL-MCNC: 72 MG/DL
BUN SERPL-MCNC: 17 MG/DL (ref 8–27)
BUN/CREAT SERPL: 16 (ref 10–24)
CALCIUM SERPL-MCNC: 10.1 MG/DL (ref 8.6–10.2)
CHLORIDE SERPL-SCNC: 103 MMOL/L (ref 96–106)
CO2 SERPL-SCNC: 21 MMOL/L (ref 20–29)
CREAT SERPL-MCNC: 1.09 MG/DL (ref 0.76–1.27)
CREAT UR-MCNC: 104.3 MG/DL
EST. AVERAGE GLUCOSE BLD GHB EST-MCNC: 157 MG/DL
GLUCOSE SERPL-MCNC: 31 MG/DL (ref 65–99)
HBA1C MFR BLD: 7.1 % (ref 4.8–5.6)
MICROALBUMIN UR-MCNC: 186 UG/ML
POTASSIUM SERPL-SCNC: 4.3 MMOL/L (ref 3.5–5.2)
SODIUM SERPL-SCNC: 143 MMOL/L (ref 134–144)

## 2019-12-23 DIAGNOSIS — I10 ESSENTIAL HYPERTENSION WITH GOAL BLOOD PRESSURE LESS THAN 130/85: ICD-10-CM

## 2019-12-23 DIAGNOSIS — E11.9 CONTROLLED TYPE 2 DIABETES MELLITUS WITHOUT COMPLICATION, WITHOUT LONG-TERM CURRENT USE OF INSULIN (HCC): ICD-10-CM

## 2019-12-23 RX ORDER — LISINOPRIL 10 MG/1
TABLET ORAL
Qty: 30 TAB | Refills: 11 | Status: SHIPPED | OUTPATIENT
Start: 2019-12-23 | End: 2020-04-28 | Stop reason: SDUPTHER

## 2020-01-28 DIAGNOSIS — E11.65 UNCONTROLLED TYPE 2 DIABETES MELLITUS WITH HYPERGLYCEMIA (HCC): ICD-10-CM

## 2020-01-28 RX ORDER — METFORMIN HYDROCHLORIDE 500 MG/1
TABLET, EXTENDED RELEASE ORAL
Qty: 120 TAB | Refills: 11 | Status: SHIPPED | OUTPATIENT
Start: 2020-01-28 | End: 2020-04-28 | Stop reason: SDUPTHER

## 2020-02-05 DIAGNOSIS — E11.9 CONTROLLED TYPE 2 DIABETES MELLITUS WITHOUT COMPLICATION, WITHOUT LONG-TERM CURRENT USE OF INSULIN (HCC): ICD-10-CM

## 2020-02-05 RX ORDER — GLIPIZIDE 10 MG/1
10 TABLET ORAL 2 TIMES DAILY
Qty: 60 TAB | Refills: 11 | Status: SHIPPED | OUTPATIENT
Start: 2020-02-05 | End: 2020-04-28 | Stop reason: SDUPTHER

## 2020-02-06 ENCOUNTER — OFFICE VISIT (OUTPATIENT)
Dept: INTERNAL MEDICINE CLINIC | Age: 65
End: 2020-02-06

## 2020-02-06 VITALS
OXYGEN SATURATION: 91 % | DIASTOLIC BLOOD PRESSURE: 72 MMHG | RESPIRATION RATE: 14 BRPM | TEMPERATURE: 101.9 F | WEIGHT: 191 LBS | HEIGHT: 69 IN | SYSTOLIC BLOOD PRESSURE: 153 MMHG | BODY MASS INDEX: 28.29 KG/M2 | HEART RATE: 94 BPM

## 2020-02-06 DIAGNOSIS — I10 ESSENTIAL HYPERTENSION: ICD-10-CM

## 2020-02-06 DIAGNOSIS — E78.5 DYSLIPIDEMIA: ICD-10-CM

## 2020-02-06 DIAGNOSIS — E11.9 CONTROLLED TYPE 2 DIABETES MELLITUS WITHOUT COMPLICATION, WITHOUT LONG-TERM CURRENT USE OF INSULIN (HCC): ICD-10-CM

## 2020-02-06 DIAGNOSIS — J44.9 CHRONIC OBSTRUCTIVE PULMONARY DISEASE, UNSPECIFIED COPD TYPE (HCC): ICD-10-CM

## 2020-02-06 DIAGNOSIS — J06.9 UPPER RESPIRATORY TRACT INFECTION, UNSPECIFIED TYPE: Primary | ICD-10-CM

## 2020-02-06 DIAGNOSIS — N40.0 BENIGN PROSTATIC HYPERPLASIA WITHOUT LOWER URINARY TRACT SYMPTOMS: ICD-10-CM

## 2020-02-06 DIAGNOSIS — J45.40 MODERATE PERSISTENT REACTIVE AIRWAY DISEASE WITHOUT COMPLICATION: ICD-10-CM

## 2020-02-06 NOTE — PROGRESS NOTES
Chief Complaint   Patient presents with    Cold Symptoms     Patient in office with comoplaints of cough and congestion (nasal). 1. Have you been to the ER, urgent care clinic since your last visit? Hospitalized since your last visit? No    2. Have you seen or consulted any other health care providers outside of the [address] [place] [address] since your last visit? Include any pap smears or colon screening.  No

## 2020-02-06 NOTE — PROGRESS NOTES
[address] [facility]  [place] [address]  [address] [place] [address]  Phone:  [phone]  Fax: [phone]       Chief Complaint   Patient presents with    Cold Symptoms     Patient in office with comoplaints of cough and congestion (nasal). .      SUBJECTIVE:    Lan Wolfe is a 71 y.o. male Comes in for return visit after not being seen for months. He comes in as a walk-in today because of an URI that started about three days ago. He now has nasal congestion, generalized malaise and persistent coughing of cloudy mucus. This is superimposed on his existing COPD and chronic cigarette smoking. He has a past history of diabetes, primary hypertension and dyslipidemia. Current Outpatient Medications   Medication Sig Dispense Refill    guaiFENesin-codeine (ROBITUSSIN AC) 100-10 mg/5 mL solution Take 5 mL by mouth four (4) times daily as needed for Cough for up to 3 days. Max Daily Amount: 20 mL. 140 mL 0    albuterol (PROVENTIL HFA, VENTOLIN HFA, PROAIR HFA) 90 mcg/actuation inhaler Take 2 Puffs by inhalation every four (4) hours as needed for Wheezing. 1 Inhaler 1    glipiZIDE (GLUCOTROL) 10 mg tablet Take 1 Tab by mouth two (2) times a day. 60 Tab 11    metFORMIN ER (GLUCOPHAGE XR) 500 mg tablet 2 tablets twice a day with breakfast and dinner 120 Tab 11    lisinopril (PRINIVIL, ZESTRIL) 10 mg tablet TAKE 1 TABLET BY MOUTH EVERY DAY 30 Tab 11    Insulin Needles, Disposable, 31 gauge x 5/16\" ndle Use to inject insulin once daily. 100 Pen Needle 11    pioglitazone (ACTOS) 45 mg tablet Take 1 Tab by mouth daily. 30 Tab 11    atorvastatin (LIPITOR) 80 mg tablet Take 1 Tab by mouth daily. 30 Tab 11    insulin NPH/insulin regular (NOVOLIN 70/30, HUMULIN 70/30) 100 unit/mL (70-30) injection Take 30 units ac breakfast 1 Vial 11    naproxen (NAPROSYN) 500 mg tablet Take 1 Tab by mouth two (2) times daily (with meals).  60 Tab 1    Blood-Glucose Meter (ONETOUCH ULTRA2) monitoring kit Use to test blood sugar once a day 1 Kit 0    glucose blood VI test strips (ONETOUCH ULTRA TEST) strip Use to test blood sugar once daily 50 Strip 11    Lancets misc Use to test blood sugar once daily 100 Each 11    sildenafil citrate (VIAGRA) 100 mg tablet Take 1 Tab by mouth as needed. 4 Tab 12    Blood-Glucose Meter monitoring kit Use twice a day 1 Kit 0     Past Medical History:   Diagnosis Date    Callous ulcer ([address] 4/13/2011    Edema 4/13/2011    Hypertension 4/13/2011     History reviewed. No pertinent surgical history.   No Known Allergies      REVIEW OF SYSTEMS:  General: negative for - chills or fever  ENT: negative for - headaches, nasal congestion or tinnitus  Respiratory: negative for - cough, hemoptysis, shortness of breath or wheezing  Cardiovascular : negative for - chest pain, edema, palpitations or shortness of breath  Gastrointestinal: negative for - abdominal pain, blood in stools, heartburn or nausea/vomiting  Genito-Urinary: no dysuria, trouble voiding, or hematuria  Musculoskeletal: negative for - gait disturbance, joint pain, joint stiffness or joint swelling  Neurological: no TIA or stroke symptoms  Hematologic: no bruises, no bleeding, no swollen glands  Integument: no lumps, mole changes, nail changes or rash  Endocrine: no malaise/lethargy or unexpected weight changes      Social History     Socioeconomic History    Marital status:      Spouse name: Not on file    Number of children: Not on file    Years of education: Not on file    Highest education level: Not on file   Tobacco Use    Smoking status: Current Every Day Smoker     Packs/day: 0.25     Years: 45.00     Pack years: 11.25    Smokeless tobacco: Never Used   Substance and Sexual Activity    Alcohol use: Yes     Comment: occasional    Drug use: No    Sexual activity: Yes     Partners: Female     Family History   Problem Relation Age of Onset    Diabetes Mother        OBJECTIVE:    Visit Vitals  /72   Pulse 94   Temp (!) 101.9 °F (38.8 °C) (Oral)   Resp 14   Ht 5' 9\" (1.753 m)   Wt 191 lb (86.6 kg)   SpO2 91%   BMI 28.21 kg/m²     CONSTITUTIONAL: well , well nourished, appears age appropriate  EYES: perrla, eom intact  ENMT:moist mucous membranes, pharynx clear  NECK: supple. Thyroid normal  RESPIRATORY: Chest: clear to ascultation and percussion   CARDIOVASCULAR: Heart: regular rate and rhythm  GASTROINTESTINAL: Abdomen: soft, bowel sounds active  HEMATOLOGIC: no pathological lymph nodes palpated  MUSCULOSKELETAL: Extremities: no edema, pulse 1+   INTEGUMENT: No unusual rashes or suspicious skin lesions noted. Nails appear normal.  NEUROLOGIC: non-focal exam   MENTAL STATUS: alert and oriented, appropriate affect      ASSESSMENT:  1. Upper respiratory tract infection, unspecified type    2. Moderate persistent reactive airway disease without complication    3. Chronic obstructive pulmonary disease, unspecified COPD type ([address]    4. Controlled type 2 diabetes mellitus without complication, without long-term current use of insulin ([address]    5. Essential hypertension    6. Dyslipidemia    7. Benign prostatic hyperplasia without lower urinary tract symptoms        PLAN:    1. The patient has a URI superimposed on his COPD. He has a mild degree of reactive airway disease. He is told to use his bronchodilators on a regular basis. I will give him a cough suppressant also. He will take Tylenol and things should improve over the next several days. He will refrain from work until Monday. During this time I encouraged him to discontinue cigarette smoking and permanently hopefully. 2. His diabetes hopefully has done well, but I will await the results of his hemoglobin A1c.  3. Blood pressure is excellent, no adjustments are made. 4. He will continue statin in view of his significantly increased cardiovascular risk. Efficacy and compliance will be assessed.     .  Orders Placed This Encounter    HEMOGLOBIN A1C WITH EAG    APOLIPOPROTEIN B    CBC WITH AUTOMATED DIFF    LIPID PANEL    METABOLIC PANEL, COMPREHENSIVE    PROSTATE SPECIFIC AG    URINALYSIS W/ RFLX MICROSCOPIC    MICROSCOPIC EXAMINATION    guaiFENesin-codeine (ROBITUSSIN AC) 100-10 mg/5 mL solution    albuterol (PROVENTIL HFA, VENTOLIN HFA, PROAIR HFA) 90 mcg/actuation inhaler         Follow-up and Dispositions    · Return in about 3 months (around 5/6/2020).            Hyacinth Hammans, MD

## 2020-02-07 LAB
ALBUMIN SERPL-MCNC: 4.5 G/DL (ref 3.8–4.8)
ALBUMIN/GLOB SERPL: 1.4 {RATIO} (ref 1.2–2.2)
ALP SERPL-CCNC: 121 IU/L (ref 39–117)
ALT SERPL-CCNC: 17 IU/L (ref 0–44)
APO B SERPL-MCNC: 74 MG/DL
APPEARANCE UR: CLEAR
AST SERPL-CCNC: 29 IU/L (ref 0–40)
BACTERIA #/AREA URNS HPF: NORMAL /[HPF]
BASOPHILS # BLD AUTO: 0.1 X10E3/UL (ref 0–0.2)
BASOPHILS NFR BLD AUTO: 1 %
BILIRUB SERPL-MCNC: 0.6 MG/DL (ref 0–1.2)
BILIRUB UR QL STRIP: NEGATIVE
BUN SERPL-MCNC: 11 MG/DL (ref 8–27)
BUN/CREAT SERPL: 10 (ref 10–24)
CALCIUM SERPL-MCNC: 10.1 MG/DL (ref 8.6–10.2)
CASTS URNS QL MICRO: NORMAL /LPF
CHLORIDE SERPL-SCNC: 97 MMOL/L (ref 96–106)
CHOLEST SERPL-MCNC: 127 MG/DL (ref 100–199)
CO2 SERPL-SCNC: 22 MMOL/L (ref 20–29)
COLOR UR: YELLOW
CREAT SERPL-MCNC: 1.05 MG/DL (ref 0.76–1.27)
EOSINOPHIL # BLD AUTO: 0.1 X10E3/UL (ref 0–0.4)
EOSINOPHIL NFR BLD AUTO: 1 %
EPI CELLS #/AREA URNS HPF: NORMAL /HPF (ref 0–10)
ERYTHROCYTE [DISTWIDTH] IN BLOOD BY AUTOMATED COUNT: 13.8 % (ref 11.6–15.4)
EST. AVERAGE GLUCOSE BLD GHB EST-MCNC: 134 MG/DL
GLOBULIN SER CALC-MCNC: 3.2 G/DL (ref 1.5–4.5)
GLUCOSE SERPL-MCNC: 36 MG/DL (ref 65–99)
GLUCOSE UR QL: NEGATIVE
HBA1C MFR BLD: 6.3 % (ref 4.8–5.6)
HCT VFR BLD AUTO: 50.6 % (ref 37.5–51)
HDLC SERPL-MCNC: 53 MG/DL
HGB BLD-MCNC: 17.3 G/DL (ref 13–17.7)
HGB UR QL STRIP: NEGATIVE
IMM GRANULOCYTES # BLD AUTO: 0 X10E3/UL (ref 0–0.1)
IMM GRANULOCYTES NFR BLD AUTO: 1 %
KETONES UR QL STRIP: ABNORMAL
LDLC SERPL CALC-MCNC: 63 MG/DL (ref 0–99)
LEUKOCYTE ESTERASE UR QL STRIP: NEGATIVE
LYMPHOCYTES # BLD AUTO: 1 X10E3/UL (ref 0.7–3.1)
LYMPHOCYTES NFR BLD AUTO: 12 %
MCH RBC QN AUTO: 30.5 PG (ref 26.6–33)
MCHC RBC AUTO-ENTMCNC: 34.2 G/DL (ref 31.5–35.7)
MCV RBC AUTO: 89 FL (ref 79–97)
MICRO URNS: ABNORMAL
MONOCYTES # BLD AUTO: 0.7 X10E3/UL (ref 0.1–0.9)
MONOCYTES NFR BLD AUTO: 8 %
MUCOUS THREADS URNS QL MICRO: PRESENT
NEUTROPHILS # BLD AUTO: 6.7 X10E3/UL (ref 1.4–7)
NEUTROPHILS NFR BLD AUTO: 77 %
NITRITE UR QL STRIP: NEGATIVE
PH UR STRIP: 5.5 [PH] (ref 5–7.5)
PLATELET # BLD AUTO: 237 X10E3/UL (ref 150–450)
POTASSIUM SERPL-SCNC: 4.7 MMOL/L (ref 3.5–5.2)
PROT SERPL-MCNC: 7.7 G/DL (ref 6–8.5)
PROT UR QL STRIP: ABNORMAL
PSA SERPL-MCNC: 1.8 NG/ML (ref 0–4)
RBC # BLD AUTO: 5.67 X10E6/UL (ref 4.14–5.8)
RBC #/AREA URNS HPF: NORMAL /HPF (ref 0–2)
SODIUM SERPL-SCNC: 138 MMOL/L (ref 134–144)
SP GR UR: 1.02 (ref 1–1.03)
TRIGL SERPL-MCNC: 57 MG/DL (ref 0–149)
UROBILINOGEN UR STRIP-MCNC: 0.2 MG/DL (ref 0.2–1)
VLDLC SERPL CALC-MCNC: 11 MG/DL (ref 5–40)
WBC # BLD AUTO: 8.5 X10E3/UL (ref 3.4–10.8)
WBC #/AREA URNS HPF: NORMAL /HPF (ref 0–5)

## 2020-02-08 RX ORDER — ALBUTEROL SULFATE 90 UG/1
2 AEROSOL, METERED RESPIRATORY (INHALATION)
Qty: 1 INHALER | Refills: 1 | Status: SHIPPED | OUTPATIENT
Start: 2020-02-08 | End: 2020-03-25 | Stop reason: SDUPTHER

## 2020-02-08 RX ORDER — CODEINE PHOSPHATE AND GUAIFENESIN 10; 100 MG/5ML; MG/5ML
5 SOLUTION ORAL
Qty: 140 ML | Refills: 0 | Status: SHIPPED | OUTPATIENT
Start: 2020-02-08 | End: 2020-02-11

## 2020-02-14 DIAGNOSIS — E78.5 DYSLIPIDEMIA: ICD-10-CM

## 2020-02-15 RX ORDER — ATORVASTATIN CALCIUM 80 MG/1
80 TABLET, FILM COATED ORAL DAILY
Qty: 30 TAB | Refills: 11 | Status: SHIPPED | OUTPATIENT
Start: 2020-02-15 | End: 2020-02-17 | Stop reason: SDUPTHER

## 2020-02-17 DIAGNOSIS — E78.5 DYSLIPIDEMIA: ICD-10-CM

## 2020-02-17 RX ORDER — ATORVASTATIN CALCIUM 80 MG/1
80 TABLET, FILM COATED ORAL DAILY
Qty: 30 TAB | Refills: 11 | Status: SHIPPED | OUTPATIENT
Start: 2020-02-17 | End: 2020-04-28 | Stop reason: SDUPTHER

## 2020-02-21 DIAGNOSIS — E78.5 DYSLIPIDEMIA: ICD-10-CM

## 2020-02-21 RX ORDER — ATORVASTATIN CALCIUM 80 MG/1
80 TABLET, FILM COATED ORAL DAILY
Qty: 30 TAB | Refills: 11 | Status: CANCELLED | OUTPATIENT
Start: 2020-02-21

## 2020-04-28 DIAGNOSIS — E11.9 CONTROLLED TYPE 2 DIABETES MELLITUS WITHOUT COMPLICATION, WITHOUT LONG-TERM CURRENT USE OF INSULIN (HCC): ICD-10-CM

## 2020-04-28 DIAGNOSIS — I10 ESSENTIAL HYPERTENSION WITH GOAL BLOOD PRESSURE LESS THAN 130/85: ICD-10-CM

## 2020-04-28 DIAGNOSIS — E11.65 UNCONTROLLED TYPE 2 DIABETES MELLITUS WITH HYPERGLYCEMIA (HCC): ICD-10-CM

## 2020-04-28 DIAGNOSIS — E78.5 DYSLIPIDEMIA: ICD-10-CM

## 2020-04-28 RX ORDER — METFORMIN HYDROCHLORIDE 500 MG/1
TABLET, EXTENDED RELEASE ORAL
Qty: 360 TAB | Refills: 3 | Status: SHIPPED | OUTPATIENT
Start: 2020-04-28 | End: 2020-09-02 | Stop reason: SDUPTHER

## 2020-04-28 RX ORDER — ATORVASTATIN CALCIUM 80 MG/1
80 TABLET, FILM COATED ORAL DAILY
Qty: 90 TAB | Refills: 3 | Status: SHIPPED | OUTPATIENT
Start: 2020-04-28 | End: 2020-09-02 | Stop reason: SDUPTHER

## 2020-04-28 RX ORDER — PIOGLITAZONEHYDROCHLORIDE 45 MG/1
45 TABLET ORAL DAILY
Qty: 90 TAB | Refills: 3 | Status: SHIPPED | OUTPATIENT
Start: 2020-04-28 | End: 2020-09-02 | Stop reason: SDUPTHER

## 2020-04-28 RX ORDER — GLIPIZIDE 10 MG/1
10 TABLET ORAL 2 TIMES DAILY
Qty: 180 TAB | Refills: 3 | Status: SHIPPED | OUTPATIENT
Start: 2020-04-28 | End: 2020-09-02 | Stop reason: SDUPTHER

## 2020-04-28 RX ORDER — LISINOPRIL 10 MG/1
TABLET ORAL
Qty: 90 TAB | Refills: 3 | Status: SHIPPED | OUTPATIENT
Start: 2020-04-28 | End: 2020-09-02 | Stop reason: SDUPTHER

## 2020-09-02 DIAGNOSIS — E11.9 CONTROLLED TYPE 2 DIABETES MELLITUS WITHOUT COMPLICATION, WITHOUT LONG-TERM CURRENT USE OF INSULIN (HCC): ICD-10-CM

## 2020-09-02 DIAGNOSIS — E78.5 DYSLIPIDEMIA: ICD-10-CM

## 2020-09-02 DIAGNOSIS — E11.65 UNCONTROLLED TYPE 2 DIABETES MELLITUS WITH HYPERGLYCEMIA (HCC): ICD-10-CM

## 2020-09-02 DIAGNOSIS — I10 ESSENTIAL HYPERTENSION WITH GOAL BLOOD PRESSURE LESS THAN 130/85: ICD-10-CM

## 2020-09-02 RX ORDER — ATORVASTATIN CALCIUM 80 MG/1
80 TABLET, FILM COATED ORAL DAILY
Qty: 90 TAB | Refills: 3 | Status: SHIPPED | OUTPATIENT
Start: 2020-09-02 | End: 2021-09-29

## 2020-09-02 RX ORDER — GLIPIZIDE 10 MG/1
10 TABLET ORAL 2 TIMES DAILY
Qty: 180 TAB | Refills: 3 | Status: SHIPPED | OUTPATIENT
Start: 2020-09-02 | End: 2021-09-29

## 2020-09-02 RX ORDER — PIOGLITAZONEHYDROCHLORIDE 45 MG/1
45 TABLET ORAL DAILY
Qty: 90 TAB | Refills: 3 | Status: SHIPPED | OUTPATIENT
Start: 2020-09-02

## 2020-09-02 RX ORDER — BLOOD SUGAR DIAGNOSTIC
STRIP MISCELLANEOUS
Qty: 50 STRIP | Refills: 11 | Status: SHIPPED | OUTPATIENT
Start: 2020-09-02

## 2020-09-02 RX ORDER — METFORMIN HYDROCHLORIDE 500 MG/1
TABLET, EXTENDED RELEASE ORAL
Qty: 360 TAB | Refills: 3 | Status: SHIPPED | OUTPATIENT
Start: 2020-09-02 | End: 2021-09-29

## 2020-09-02 RX ORDER — PEN NEEDLE, DIABETIC 30 GX3/16"
NEEDLE, DISPOSABLE MISCELLANEOUS
Qty: 100 PEN NEEDLE | Refills: 11 | Status: SHIPPED | OUTPATIENT
Start: 2020-09-02

## 2020-09-02 RX ORDER — LISINOPRIL 10 MG/1
TABLET ORAL
Qty: 90 TAB | Refills: 3 | Status: SHIPPED | OUTPATIENT
Start: 2020-09-02

## 2021-09-29 DIAGNOSIS — E11.65 UNCONTROLLED TYPE 2 DIABETES MELLITUS WITH HYPERGLYCEMIA (HCC): ICD-10-CM

## 2021-09-29 DIAGNOSIS — E11.9 CONTROLLED TYPE 2 DIABETES MELLITUS WITHOUT COMPLICATION, WITHOUT LONG-TERM CURRENT USE OF INSULIN (HCC): ICD-10-CM

## 2021-09-29 DIAGNOSIS — E78.5 DYSLIPIDEMIA: ICD-10-CM

## 2021-09-29 RX ORDER — GLIPIZIDE 10 MG/1
TABLET ORAL
Qty: 180 TABLET | Refills: 3 | Status: SHIPPED | OUTPATIENT
Start: 2021-09-29

## 2021-09-29 RX ORDER — ATORVASTATIN CALCIUM 80 MG/1
TABLET, FILM COATED ORAL
Qty: 90 TABLET | Refills: 3 | Status: SHIPPED | OUTPATIENT
Start: 2021-09-29

## 2021-09-29 RX ORDER — METFORMIN HYDROCHLORIDE 500 MG/1
TABLET, EXTENDED RELEASE ORAL
Qty: 360 TABLET | Refills: 2 | Status: SHIPPED | OUTPATIENT
Start: 2021-09-29

## 2022-03-18 PROBLEM — E66.3 OVERWEIGHT (BMI 25.0-29.9): Status: ACTIVE | Noted: 2019-04-13

## 2022-03-18 PROBLEM — E11.65 UNCONTROLLED TYPE 2 DIABETES MELLITUS WITH HYPERGLYCEMIA (HCC): Status: ACTIVE | Noted: 2019-01-13

## 2022-03-19 PROBLEM — H91.93 DECREASED HEARING OF BOTH EARS: Status: ACTIVE | Noted: 2019-02-12

## 2022-03-19 PROBLEM — L84 PRE-ULCERATIVE CALLUSES: Status: ACTIVE | Noted: 2019-02-12

## 2022-03-19 PROBLEM — J44.9 CHRONIC OBSTRUCTIVE PULMONARY DISEASE (HCC): Status: ACTIVE | Noted: 2018-02-28

## 2022-03-19 PROBLEM — E78.5 DYSLIPIDEMIA: Status: ACTIVE | Noted: 2018-02-28

## 2022-11-28 DIAGNOSIS — E11.65 UNCONTROLLED TYPE 2 DIABETES MELLITUS WITH HYPERGLYCEMIA (HCC): ICD-10-CM

## 2022-11-28 RX ORDER — METFORMIN HYDROCHLORIDE 500 MG/1
TABLET, EXTENDED RELEASE ORAL
Qty: 360 TABLET | Refills: 2 | Status: SHIPPED | OUTPATIENT
Start: 2022-11-28

## 2023-04-21 NOTE — PROGRESS NOTES
1. Have you been to the ER, urgent care clinic since your last visit? Hospitalized since your last visit? No    2. Have you seen or consulted any other health care providers outside of the [address] [place] [address] since your last visit? Include any pap smears or colon screening.  No      Black spots on back listed. [] Progression has been slowed due to co-morbidities. [x] Plan just implemented, too soon to assess goals progression <30days   [] Goals require adjustment due to lack of progress  [] Patient is not progressing as expected and requires additional follow up with physician  [] Other    Prognosis for POC: [x] Good [] Fair  [] Poor      Patient requires continued skilled intervention: [x] Yes  [] No    Treatment/Activity Tolerance:  [x] Patient able to complete treatment  [] Patient limited by fatigue  [] Patient limited by pain    [] Patient limited by other medical complications  [] Other:     ASSESSMENT:  Worked on both LEs equally today for quad flexibility, hip abductor strength, gait and balance for prep for DC      PLAN: See eval  [x] Continue per plan of care [] Alter current plan (see comments above)  [] Plan of care initiated [] Hold pending MD visit [] Discharge      Electronically signed by:  Lissy Hunt PT      Note: If patient does not return for scheduled/ recommended follow up visits, this note will serve as a discharge from care along with most recent update on progress. [facility] Clinical Indication